# Patient Record
Sex: FEMALE | Race: WHITE | Employment: OTHER | URBAN - METROPOLITAN AREA
[De-identification: names, ages, dates, MRNs, and addresses within clinical notes are randomized per-mention and may not be internally consistent; named-entity substitution may affect disease eponyms.]

---

## 2017-06-28 ENCOUNTER — HOSPITAL ENCOUNTER (OUTPATIENT)
Dept: MRI IMAGING | Age: 62
Discharge: HOME | End: 2017-06-28
Attending: FAMILY MEDICINE
Payer: COMMERCIAL

## 2017-06-28 DIAGNOSIS — M25.532 LEFT WRIST PAIN: ICD-10-CM

## 2017-06-28 PROCEDURE — 73221 MRI JOINT UPR EXTREM W/O DYE: CPT

## 2017-07-21 ENCOUNTER — HOSPITAL ENCOUNTER (OUTPATIENT)
Dept: MAMMOGRAPHY | Age: 62
Discharge: HOME | End: 2017-07-21
Attending: OBSTETRICS & GYNECOLOGY
Payer: COMMERCIAL

## 2017-07-21 DIAGNOSIS — Z12.31 ENCOUNTER FOR SCREENING MAMMOGRAM FOR MALIGNANT NEOPLASM OF BREAST: ICD-10-CM

## 2017-07-21 PROCEDURE — 77067 SCR MAMMO BI INCL CAD: CPT

## 2017-09-15 ENCOUNTER — HOSPITAL ENCOUNTER (OUTPATIENT)
Dept: CT IMAGING | Age: 62
Discharge: HOME | End: 2017-09-15
Payer: COMMERCIAL

## 2017-09-15 DIAGNOSIS — Z79.01 ADMISSION FOR LONG-TERM (CURRENT) USE OF ANTICOAGULANTS: ICD-10-CM

## 2017-09-15 DIAGNOSIS — Z51.81 ADMISSION FOR LONG-TERM (CURRENT) USE OF ANTICOAGULANTS: ICD-10-CM

## 2017-09-15 DIAGNOSIS — Z80.0 FAMILY HISTORY OF MALIGNANT NEOPLASM OF GASTROINTESTINAL TRACT: ICD-10-CM

## 2017-09-15 DIAGNOSIS — Z12.11 SPECIAL SCREENING FOR MALIGNANT NEOPLASMS, COLON: ICD-10-CM

## 2017-09-15 PROCEDURE — 74261 CT COLONOGRAPHY DX: CPT

## 2017-11-01 ENCOUNTER — HOSPITAL ENCOUNTER (OUTPATIENT)
Dept: PHYSICAL THERAPY | Age: 62
Discharge: HOME | End: 2017-11-01
Attending: FAMILY MEDICINE
Payer: COMMERCIAL

## 2017-11-01 DIAGNOSIS — M25.532 LEFT WRIST PAIN: ICD-10-CM

## 2017-11-01 PROCEDURE — 97165 OT EVAL LOW COMPLEX 30 MIN: CPT

## 2017-11-01 NOTE — PROGRESS NOTES
Ambulatory/Rehab Services H2 Model Falls Risk Assessment    Risk Factor Pts. ·   Confusion/Disorientation/Impulsivity  []    4 ·   Symptomatic Depression  []   2 ·   Altered Elimination  []   1 ·   Dizziness/Vertigo  []   1 ·   Gender (Male)  []   1 ·   Any administered antiepileptics (anticonvulsants):  []   2 ·   Any administered benzodiazepines:  []   1 ·   Visual Impairment (specify):  []   1 ·   Portable Oxygen Use  []   1 ·   Orthostatic ? BP  []   1 ·   History of Recent Falls (within 3 mos.)  []   5     Ability to Rise from Chair (choose one) Pts. ·   Ability to rise in a single movement  [x]   0 ·   Pushes up, successful in one attempt  []   1 ·   Multiple attempts, but successful  []   3 ·   Unable to rise without assistance  []   4   Total: (5 or greater = High Risk) 0     Falls Prevention Plan:   []                Physical Limitations to Exercise (specify):   []                Mobility Assistance Device (type):   []                Exercise/Equipment Adaptation (specify):    ©2010 [facility] Patent #[id number].  Federal Law prohibits the replication, distribution or use without written permission from [facility]

## 2017-11-01 NOTE — PROGRESS NOTES
Maxwell Rosaura  : 1955 [facility] [place] [address], [place] [address].  [phone]   [phone]         OUTPATIENT OCCUPATIONAL THERAPY: Initial Assessment 2017    ICD-10: Treatment Diagnosis: Stiffness of left wrist, not elsewhere classified (M25.632)Pain in left wrist (M25.532)  Precautions/Allergies:   Codeine; Darvocet a500 [propoxyphene n-acetaminophen]; Iodinated contrast- oral and iv dye; and Phenergan [promethazine]   Fall Risk Score: 0 (? 5 = High Risk)  MD Orders: Evaluate and treat MEDICAL/REFERRING DIAGNOSIS:   Left wrist pain [M25.532]   DATE OF ONSET: several months ago   REFERRING PHYSICIAN: Aylin Franco MD  RETURN PHYSICIAN APPOINTMENT: 6 weeks     INITIAL ASSESSMENT:  Ms. Bo Arango presents with decreased functional use, strength and range of motion of her left wrist and upper extremity that is affecting her independence with activities of daily living and ability to perform job tasks. I feel that Ms. Bo Arango will benefit from skilled occupational therapy to maximize the functional use of her wrist and upper extremity in daily activities and work tasks. PLAN OF CARE:   PROBLEM LIST:  1. Pain in left wrist.  2. Decreased motion in left wrist.  3. Decreased strength in left hand. INTERVENTIONS PLANNED:  1. Modalities that may include fluidotherapy, paraffin, ultrasound, and light therapy. 2. Therapeutic exercise including a home exercise program.  3. Manual therapy. 4. Therapeutic activities. TREATMENT PLAN:  Effective Dates: 2017 TO 2018. Frequency/Duration: 1 time a week for 10 weeks  GOALS: (Goals have been discussed and agreed upon with patient.)  Short-Term Functional Goals: Time Frame: 4 weeks  1. Decrease pain to 4 to allow patient to perform self care tasks. 2. Increase motion in left wrist by 10 degrees to improve functional use of upper extremity in ADL activities.   3. Increase strength in left hand by 5 pounds to allow patient to  and lift objects during self care activities. Discharge Goals: Time Frame: 10 weeks  1. Decrease pain to 2 to allow patient to perform all household and work tasks. 2. Increase motion in left wrist by 20 degreees to allow patient to perform all ADL activities. 3. Increase strength in left hand by 8 pounds to allow patient to , lift, hold, and carry heavy objects. Rehabilitation Potential For Stated Goals: Good  Regarding Ramond Lipoma Kasie's therapy, I certify that the treatment plan above will be carried out by a therapist or under their direction. Thank you for this referral,  Nelwyn Kussmaul, OT       Referring Physician Signature: Rudolpho Klinefelter, MD _________________________  Date _________            The information in this section was collected on 11/1/2017 (except where otherwise noted). OCCUPATIONAL PROFILE & HISTORY:   History of Present Injury/Illness (Reason for Referral): The patient has developed increasing pain in her left wrist for several months. Past Medical History/Comorbidities:   Ms. Dirk Lozano  has a past medical history of Allergic rhinitis; Asthma; Atrial fibrillation ([facility] [address] (11/7/2013); Chicken pox; Coronary artery disease; Depression; Essential hypertension, benign; Fatty liver; GERD (gastroesophageal reflux disease); Hyperlipidemia; Measles; Mitral stenosis; Osteoarthritis; Other disorder of calcium metabolism; Pneumonia (1984); Rheumatic fever; Rosacea; TIA (transient ischemic attack) (2013); and Unspecified hypothyroidism. Ms. Dirk Lozano  has a past surgical history that includes gyn; refractive surgery (Bilateral); cardiac surg procedure unlist (2010); heart catheterization (2010); colonoscopy (2008); colonoscopy (2013); pacemaker (2013); and tonsillectomy (1983).   Social History/Living Environment:   Home Environment: Private residence  Prior Level of Function/Work/Activity:  independent  Dominant Side:         RIGHT    Current Medications: Current Outpatient Prescriptions:     Bacillus coagulans 250 million cell chew, Take  by mouth., Disp: , Rfl:     diclofenac (VOLTAREN) 1 % gel, Apply 4 g to affected area four (4) times daily. (Patient taking differently: Apply 4 g to affected area three (3) times daily.), Disp: 500 g, Rfl: 5    budesonide-formoterol (SYMBICORT) 160-4.5 mcg/actuation HFA inhaler, Take 2 Puffs by inhalation two (2) times a day., Disp: 3 Inhaler, Rfl: 3    clorazepate (TRANXENE) 7.5 mg tablet, Take 1 Tab by mouth two (2) times a day. Max Daily Amount: 15 mg. (Patient taking differently: Take 7.5 mg by mouth as needed.), Disp: 30 Tab, Rfl: 5    simvastatin (ZOCOR) 40 mg tablet, Take 1 Tab by mouth nightly., Disp: 90 Tab, Rfl: 3    warfarin (COUMADIN) 5 mg tablet, Take 1 Tab by mouth daily. , Disp: 90 Tab, Rfl: 3    montelukast (SINGULAIR) 10 mg tablet, Take 1 Tab by mouth daily. , Disp: 90 Tab, Rfl: 3    fluticasone (FLONASE) 50 mcg/actuation nasal spray, 2 sprays each nostril daily, Disp: 3 Bottle, Rfl: 3    albuterol (PROAIR HFA) 90 mcg/actuation inhaler, Take 2 Puffs by inhalation every four (4) hours as needed for Wheezing., Disp: 3 Inhaler, Rfl: 3    co-enzyme Q-10 (CO Q-10) 100 mg capsule, Take 100 mg by mouth daily. , Disp: , Rfl:     multivitamin (ONE A DAY) tablet, Take 1 Tab by mouth daily. , Disp: , Rfl:     acetaminophen (TYLENOL EXTRA STRENGTH) 500 mg tablet, Take  by mouth every six (6) hours as needed for Pain., Disp: , Rfl:     naproxen (NAPROSYN) 500 mg tablet, Take 500 mg by mouth as needed. , Disp: , Rfl:     B.infantis-B.ani-B.long-B.bifi (PROBIOTIC 4X) 10-15 mg TbEC, Take 1 Tab by mouth daily. , Disp: , Rfl:     aspirin delayed-release 81 mg tablet, Take 162 mg by mouth daily. , Disp: , Rfl:     Omega-3-DHA-EPA-Fish Oil 1,000 (120-180) mg cap, Take  by mouth., Disp: , Rfl:    Date Last Reviewed:  11/1/2017    Number of medical conditions (excluding presenting problem) that affect the Plan of Care: Brief history (0):  LOW COMPLEXITY   ASSESSMENT OF OCCUPATIONAL PERFORMANCE:   RANGE OF MOTION:     · AROM: Left wrist motion is as follows: extension 50, flexion 35, U.D. 30, R.D. 15, supination 80, pronation 84 degrees. STRENGTH:   STRENGTH: Right 58 lbs. Left 52 lbs. LAT PINCH: Right 16 lbs. Left 14 lbs. SENSATION:  Intact           Physical Skills Involved:  1. Range of Motion  2. Strength  3. Pain (Chronic) Cognitive Skills Affected (resulting in the inability to perform in a timely and safe manner): 1. none Psychosocial Skills Affected:  1. none   Number of elements that affect the Plan of Care: 1-3:  LOW COMPLEXITY   CLINICAL DECISION MAKING:   Outcome Measure: Tool Used: Disabilities of the Arm, Shoulder and Hand (DASH) Questionnaire - Quick Version  Score:  Initial: 22/55  Most Recent: X/55 (Date: -- )   Interpretation of Score: The DASH is designed to measure the activities of daily living in person's with upper extremity dysfunction or pain. Each section is scored on a 1-5 scale, 5 representing the greatest disability. The scores of each section are added together for a total score of 55. Score 11 12-19 20-28 29-37 38-45 46-54 55   Modifier CH CI CJ CK CL CM CN     ? Carrying, Moving, and Handling Objects:     - CURRENT STATUS: CJ - 20%-39% impaired, limited or restricted    - GOAL STATUS: CI - 1%-19% impaired, limited or restricted    - D/C STATUS:  ---------------To be determined---------------      Medical Necessity:   · Patient is expected to demonstrate progress in strength and range of motion to increase independence with ADL,household and work activities. .  Reason for Services/Other Comments:  · Patient has limited motion ,strength and function in her left U.E..  Clinical Decision-Making Assessment:     Clinical Decision-Making: LOW COMPLEXITY   TREATMENT:   (In addition to Assessment/Re-Assessment sessions the following treatments were rendered)  Pre-treatment Symptoms/Complaints:  Pain and stiffness in left wrist.  Pain: Initial: Pain Intensity 1: 7  Pain Location 1: Wrist, Hand  Pain Orientation 1: Left    Post Session:  0     Patient was instructed in a home exercise program.      Treatment/Session Assessment:    · Response to Treatment:  Patients tolerated treatment well with no complications. Upon completion of treatment, skin condition was normal..  · Compliance with Program/Exercises: Will assess as treatment progresses. · Recommendations/Intent for next treatment session: \"Next visit will focus on advancements to more challenging activities\".   Total Treatment Duration:  OT Patient Time In/Time Out  Time In: 0730  Time Out: [address] [facility],

## 2017-11-09 ENCOUNTER — HOSPITAL ENCOUNTER (OUTPATIENT)
Dept: PHYSICAL THERAPY | Age: 62
Discharge: HOME | End: 2017-11-09
Attending: FAMILY MEDICINE
Payer: COMMERCIAL

## 2017-11-09 PROCEDURE — 97018 PARAFFIN BATH THERAPY: CPT

## 2017-11-09 PROCEDURE — 97110 THERAPEUTIC EXERCISES: CPT

## 2017-11-09 PROCEDURE — 97140 MANUAL THERAPY 1/> REGIONS: CPT

## 2017-11-10 NOTE — PROGRESS NOTES
Shira Alfaro  : 1955 [facility] [place] [address] [facility], [address], [place] [address].  [phone]   [phone]         OUTPATIENT OCCUPATIONAL THERAPY: Daily Note 2017    ICD-10: Treatment Diagnosis: Stiffness of left wrist, not elsewhere classified (M25.632)Pain in left wrist (M25.532)  Precautions/Allergies:   Codeine; Darvocet a500 [propoxyphene n-acetaminophen]; Iodinated contrast- oral and iv dye; and Phenergan [promethazine]   Fall Risk Score: 0 (? 5 = High Risk)  MD Orders: Evaluate and treat MEDICAL/REFERRING DIAGNOSIS:   Pain in left wrist [M25.532]   DATE OF ONSET: several months ago   REFERRING PHYSICIAN: Freddy Rhoades MD  RETURN PHYSICIAN APPOINTMENT: 6 weeks     INITIAL ASSESSMENT:  Ms. Bri Pretty presents with decreased functional use, strength and range of motion of her left wrist and upper extremity that is affecting her independence with activities of daily living and ability to perform job tasks. I feel that Ms. Bri Pretty will benefit from skilled occupational therapy to maximize the functional use of her wrist and upper extremity in daily activities and work tasks. PLAN OF CARE:   PROBLEM LIST:  1. Pain in left wrist.  2. Decreased motion in left wrist.  3. Decreased strength in left hand. INTERVENTIONS PLANNED:  1. Modalities that may include fluidotherapy, paraffin, ultrasound, and light therapy. 2. Therapeutic exercise including a home exercise program.  3. Manual therapy. 4. Therapeutic activities. TREATMENT PLAN:  Effective Dates: 2017 TO 2018. Frequency/Duration: 1 time a week for 10 weeks  GOALS: (Goals have been discussed and agreed upon with patient.)  Short-Term Functional Goals: Time Frame: 4 weeks  1. Decrease pain to 4 to allow patient to perform self care tasks. 2. Increase motion in left wrist by 10 degrees to improve functional use of upper extremity in ADL activities.   3. Increase strength in left hand by 5 pounds to allow patient to  and lift objects during self care activities. Discharge Goals: Time Frame: 10 weeks  1. Decrease pain to 2 to allow patient to perform all household and work tasks. 2. Increase motion in left wrist by 20 degreees to allow patient to perform all ADL activities. 3. Increase strength in left hand by 8 pounds to allow patient to , lift, hold, and carry heavy objects. Rehabilitation Potential For Stated Goals: Good  Regarding Deann Ferro's therapy, I certify that the treatment plan above will be carried out by a therapist or under their direction. Thank you for this referral,  Louie Fleming OT       Referring Physician Signature: Al Fragoso MD _________________________  Date _________            The information in this section was collected on 11/1/2017 (except where otherwise noted). OCCUPATIONAL PROFILE & HISTORY:   History of Present Injury/Illness (Reason for Referral): The patient has developed increasing pain in her left wrist for several months. Past Medical History/Comorbidities:   Ms. Ginnie Krabbe  has a past medical history of Allergic rhinitis; Asthma; Atrial fibrillation ([address] (11/7/2013); Chicken pox; Coronary artery disease; Depression; Essential hypertension, benign; Fatty liver; GERD (gastroesophageal reflux disease); Hyperlipidemia; Measles; Mitral stenosis; Osteoarthritis; Other disorder of calcium metabolism; Pneumonia (1984); Rheumatic fever; Rosacea; TIA (transient ischemic attack) (2013); and Unspecified hypothyroidism. Ms. Ginnie Krabbe  has a past surgical history that includes gyn; refractive surgery (Bilateral); cardiac surg procedure unlist (2010); heart catheterization (2010); colonoscopy (2008); colonoscopy (2013); pacemaker (2013); and tonsillectomy (1983).   Social History/Living Environment:      Prior Level of Function/Work/Activity:  independent  Dominant Side:         RIGHT    Current Medications:    Current Outpatient Prescriptions:    Bacillus coagulans 250 million cell chew, Take  by mouth., Disp: , Rfl:     diclofenac (VOLTAREN) 1 % gel, Apply 4 g to affected area four (4) times daily. (Patient taking differently: Apply 4 g to affected area three (3) times daily.), Disp: 500 g, Rfl: 5    budesonide-formoterol (SYMBICORT) 160-4.5 mcg/actuation HFA inhaler, Take 2 Puffs by inhalation two (2) times a day., Disp: 3 Inhaler, Rfl: 3    clorazepate (TRANXENE) 7.5 mg tablet, Take 1 Tab by mouth two (2) times a day. Max Daily Amount: 15 mg. (Patient taking differently: Take 7.5 mg by mouth as needed.), Disp: 30 Tab, Rfl: 5    simvastatin (ZOCOR) 40 mg tablet, Take 1 Tab by mouth nightly., Disp: 90 Tab, Rfl: 3    warfarin (COUMADIN) 5 mg tablet, Take 1 Tab by mouth daily. , Disp: 90 Tab, Rfl: 3    montelukast (SINGULAIR) 10 mg tablet, Take 1 Tab by mouth daily. , Disp: 90 Tab, Rfl: 3    fluticasone (FLONASE) 50 mcg/actuation nasal spray, 2 sprays each nostril daily, Disp: 3 Bottle, Rfl: 3    albuterol (PROAIR HFA) 90 mcg/actuation inhaler, Take 2 Puffs by inhalation every four (4) hours as needed for Wheezing., Disp: 3 Inhaler, Rfl: 3    co-enzyme Q-10 (CO Q-10) 100 mg capsule, Take 100 mg by mouth daily. , Disp: , Rfl:     multivitamin (ONE A DAY) tablet, Take 1 Tab by mouth daily. , Disp: , Rfl:     acetaminophen (TYLENOL EXTRA STRENGTH) 500 mg tablet, Take  by mouth every six (6) hours as needed for Pain., Disp: , Rfl:     naproxen (NAPROSYN) 500 mg tablet, Take 500 mg by mouth as needed. , Disp: , Rfl:     B.infantis-B.ani-B.long-B.bifi (PROBIOTIC 4X) 10-15 mg TbEC, Take 1 Tab by mouth daily. , Disp: , Rfl:     aspirin delayed-release 81 mg tablet, Take 162 mg by mouth daily. , Disp: , Rfl:     Omega-3-DHA-EPA-Fish Oil 1,000 (120-180) mg cap, Take  by mouth., Disp: , Rfl:    Date Last Reviewed:  11/9/2017    Number of medical conditions (excluding presenting problem) that affect the Plan of Care: Brief history (0):  LOW COMPLEXITY ASSESSMENT OF OCCUPATIONAL PERFORMANCE:   RANGE OF MOTION:     · AROM: Left wrist motion is as follows: extension 50, flexion 35, U.D. 30, R.D. 15, supination 80, pronation 84 degrees. STRENGTH:   STRENGTH: Right 58 lbs. Left 52 lbs. LAT PINCH: Right 16 lbs. Left 14 lbs. SENSATION:  Intact           Physical Skills Involved:  1. Range of Motion  2. Strength  3. Pain (Chronic) Cognitive Skills Affected (resulting in the inability to perform in a timely and safe manner): 1. none Psychosocial Skills Affected:  1. none   Number of elements that affect the Plan of Care: 1-3:  LOW COMPLEXITY   CLINICAL DECISION MAKING:   Outcome Measure: Tool Used: Disabilities of the Arm, Shoulder and Hand (DASH) Questionnaire - Quick Version  Score:  Initial: 22/55  Most Recent: X/55 (Date: -- )   Interpretation of Score: The DASH is designed to measure the activities of daily living in person's with upper extremity dysfunction or pain. Each section is scored on a 1-5 scale, 5 representing the greatest disability. The scores of each section are added together for a total score of 55. Score 11 12-19 20-28 29-37 38-45 46-54 55   Modifier CH CI CJ CK CL CM CN     ? Carrying, Moving, and Handling Objects:     - CURRENT STATUS: CJ - 20%-39% impaired, limited or restricted    - GOAL STATUS: CI - 1%-19% impaired, limited or restricted    - D/C STATUS:  ---------------To be determined---------------      Medical Necessity:   · Patient is expected to demonstrate progress in strength and range of motion to increase independence with ADL,household and work activities. .  Reason for Services/Other Comments:  · Patient has limited motion ,strength and function in her left U.E..  Clinical Decision-Making Assessment:     Clinical Decision-Making: LOW COMPLEXITY   TREATMENT:   (In addition to Assessment/Re-Assessment sessions the following treatments were rendered)  Pre-treatment Symptoms/Complaints:  Pain and stiffness in left wrist.  Pain: Initial: Pain Intensity 1: 5  Pain Location 1: Hand, Wrist  Pain Orientation 1: Left    Post Session:  0     Patient was instructed in a home exercise program.  Patient stated \"I am moving a little better\"    Manual Therapy: (Soft Tissue Mobilization Duration  Duration: 15 Minutes  Duration: 15 Minutes): Technique: Retrograde massage (followed by light tx)  LUE Soft Tissue Mobilization: Yes  Technique: Retrograde massage   Therapeutic Exercise:                                                                               : The patient's home exercise program was removed. Date:  11/9/17 Date: Date: Date: Date:   Activity/Exercise Parameters Parameters Parameters Parameters Parameters   AROM during Fluidotherapy 20 min       Paraffin with Stretch   15 min       Retrograde massage, Friction Scar massage, Joint Mobilization   15 min  Light tx       Scarf Curl   2 min       Washer Game 2 min       Individual Gripper          Hand Ashton          Cones          Pegs          Clothes Pins          A-R Bar          Exerstick          Velcro-Roll                  RESISTIVE EXERCISES Weight/ Sets/Reps   Weight/ Sets/Reps Weight/ Sets/Reps Weight/ Sets/Reps Weight/ Sets/Reps   WEIGHT WELL        Sup/Pro        UD/RD        Wrist Flex/Ext        Free Weights          UBE(Minutes)          Nautilus        Compound Row        Vertical Chest        Overhead Press                    HEP: As above; handouts given to patient for all exercises.     Therapeutic Modalities:         Left Wrist Heat  Type: Paraffin bath  Duration: 15 minutes  Patient Position: Sitting                                     Joint Mobilization:        Treatment Times:  · Therapeutic Exercise: 15 minutes  · Manual Therapy: 15 minutes  · Parafin: 15 minutes  · Whirlpool:  minutes  · Other:  minutes       Treatment/Session Assessment:    · Response to Treatment:  Patients tolerated treatment well with no complications. Upon completion of treatment, skin condition was normal..  · Compliance with Program/Exercises: Will assess as treatment progresses. · Recommendations/Intent for next treatment session: \"Next visit will focus on advancements to more challenging activities\". Will continue per MD.  Total Treatment Duration:  OT Patient Time In/Time Out  Time In: 0730  Time Out: [address] [facility],

## 2017-11-16 ENCOUNTER — HOSPITAL ENCOUNTER (OUTPATIENT)
Dept: PHYSICAL THERAPY | Age: 62
Discharge: HOME | End: 2017-11-16
Attending: FAMILY MEDICINE
Payer: COMMERCIAL

## 2017-11-16 PROCEDURE — 97018 PARAFFIN BATH THERAPY: CPT

## 2017-11-16 PROCEDURE — 97110 THERAPEUTIC EXERCISES: CPT

## 2017-11-16 PROCEDURE — 97140 MANUAL THERAPY 1/> REGIONS: CPT

## 2017-11-16 NOTE — PROGRESS NOTES
Brenda Horacio BROOKSB: 1955 [facility]  [address], [address] [address],[address], [address] [facility]  [phone]   [phone]         OUTPATIENT OCCUPATIONAL THERAPY: Daily Note 2017    ICD-10: Treatment Diagnosis: Stiffness of left wrist, not elsewhere classified (M25.632)Pain in left wrist (M25.532)  Precautions/Allergies:   Codeine; Darvocet a500 [propoxyphene n-acetaminophen]; Iodinated contrast- oral and iv dye; and Phenergan [promethazine]   Fall Risk Score: 0 (? 5 = High Risk)  MD Orders: Evaluate and treat MEDICAL/REFERRING DIAGNOSIS:   Pain in left wrist [M25.532]   DATE OF ONSET: several months ago   REFERRING PHYSICIAN: Maggie Long MD  RETURN PHYSICIAN APPOINTMENT: 6 weeks     INITIAL ASSESSMENT:  Ms. Nathaly Agee presents with decreased functional use, strength and range of motion of her left wrist and upper extremity that is affecting her independence with activities of daily living and ability to perform job tasks. I feel that Ms. Nathaly Agee will benefit from skilled occupational therapy to maximize the functional use of her wrist and upper extremity in daily activities and work tasks. PLAN OF CARE:   PROBLEM LIST:  1. Pain in left wrist.  2. Decreased motion in left wrist.  3. Decreased strength in left hand. INTERVENTIONS PLANNED:  1. Modalities that may include fluidotherapy, paraffin, ultrasound, and light therapy. 2. Therapeutic exercise including a home exercise program.  3. Manual therapy. 4. Therapeutic activities. TREATMENT PLAN:  Effective Dates: 2017 TO 2018. Frequency/Duration: 1 time a week for 10 weeks  GOALS: (Goals have been discussed and agreed upon with patient.)  Short-Term Functional Goals: Time Frame: 4 weeks  1. Decrease pain to 4 to allow patient to perform self care tasks. 2. Increase motion in left wrist by 10 degrees to improve functional use of upper extremity in ADL activities.   3. Increase strength in left hand by 5 pounds to allow patient to  and lift objects during self care activities. Discharge Goals: Time Frame: 10 weeks  1. Decrease pain to 2 to allow patient to perform all household and work tasks. 2. Increase motion in left wrist by 20 degreees to allow patient to perform all ADL activities. 3. Increase strength in left hand by 8 pounds to allow patient to , lift, hold, and carry heavy objects. Rehabilitation Potential For Stated Goals: Good  Regarding Olive Ferro's therapy, I certify that the treatment plan above will be carried out by a therapist or under their direction. Thank you for this referral,  Janis Alejandra OT       Referring Physician Signature: Maggie Long MD _________________________  Date _________            The information in this section was collected on 11/1/2017 (except where otherwise noted). OCCUPATIONAL PROFILE & HISTORY:   History of Present Injury/Illness (Reason for Referral): The patient has developed increasing pain in her left wrist for several months. Past Medical History/Comorbidities:   Ms. Nathaly Agee  has a past medical history of Allergic rhinitis; Asthma; Atrial fibrillation ([facility] [address] (11/7/2013); Chicken pox; Coronary artery disease; Depression; Essential hypertension, benign; Fatty liver; GERD (gastroesophageal reflux disease); Hyperlipidemia; Measles; Mitral stenosis; Osteoarthritis; Other disorder of calcium metabolism; Pneumonia (1984); Rheumatic fever; Rosacea; TIA (transient ischemic attack) (2013); and Unspecified hypothyroidism. Ms. Nathaly Agee  has a past surgical history that includes gyn; refractive surgery (Bilateral); cardiac surg procedure unlist (2010); heart catheterization (2010); colonoscopy (2008); colonoscopy (2013); pacemaker (2013); and tonsillectomy (1983).   Social History/Living Environment:      Prior Level of Function/Work/Activity:  independent  Dominant Side:         RIGHT    Current Medications:    Current Outpatient Prescriptions:    Bacillus coagulans 250 million cell chew, Take  by mouth., Disp: , Rfl:     diclofenac (VOLTAREN) 1 % gel, Apply 4 g to affected area four (4) times daily. (Patient taking differently: Apply 4 g to affected area three (3) times daily.), Disp: 500 g, Rfl: 5    budesonide-formoterol (SYMBICORT) 160-4.5 mcg/actuation HFA inhaler, Take 2 Puffs by inhalation two (2) times a day., Disp: 3 Inhaler, Rfl: 3    clorazepate (TRANXENE) 7.5 mg tablet, Take 1 Tab by mouth two (2) times a day. Max Daily Amount: 15 mg. (Patient taking differently: Take 7.5 mg by mouth as needed.), Disp: 30 Tab, Rfl: 5    simvastatin (ZOCOR) 40 mg tablet, Take 1 Tab by mouth nightly., Disp: 90 Tab, Rfl: 3    warfarin (COUMADIN) 5 mg tablet, Take 1 Tab by mouth daily. , Disp: 90 Tab, Rfl: 3    montelukast (SINGULAIR) 10 mg tablet, Take 1 Tab by mouth daily. , Disp: 90 Tab, Rfl: 3    fluticasone (FLONASE) 50 mcg/actuation nasal spray, 2 sprays each nostril daily, Disp: 3 Bottle, Rfl: 3    albuterol (PROAIR HFA) 90 mcg/actuation inhaler, Take 2 Puffs by inhalation every four (4) hours as needed for Wheezing., Disp: 3 Inhaler, Rfl: 3    co-enzyme Q-10 (CO Q-10) 100 mg capsule, Take 100 mg by mouth daily. , Disp: , Rfl:     multivitamin (ONE A DAY) tablet, Take 1 Tab by mouth daily. , Disp: , Rfl:     acetaminophen (TYLENOL EXTRA STRENGTH) 500 mg tablet, Take  by mouth every six (6) hours as needed for Pain., Disp: , Rfl:     naproxen (NAPROSYN) 500 mg tablet, Take 500 mg by mouth as needed. , Disp: , Rfl:     B.infantis-B.ani-B.long-B.bifi (PROBIOTIC 4X) 10-15 mg TbEC, Take 1 Tab by mouth daily. , Disp: , Rfl:     aspirin delayed-release 81 mg tablet, Take 162 mg by mouth daily. , Disp: , Rfl:     Omega-3-DHA-EPA-Fish Oil 1,000 (120-180) mg cap, Take  by mouth., Disp: , Rfl:    Date Last Reviewed:  11/9/2017    Number of medical conditions (excluding presenting problem) that affect the Plan of Care: Brief history (0):  LOW COMPLEXITY ASSESSMENT OF OCCUPATIONAL PERFORMANCE:   RANGE OF MOTION:     · AROM: Left wrist motion is as follows: extension 50, flexion 35, U.D. 30, R.D. 15, supination 80, pronation 84 degrees. STRENGTH:   STRENGTH: Right 58 lbs. Left 52 lbs. LAT PINCH: Right 16 lbs. Left 14 lbs. SENSATION:  Intact           Physical Skills Involved:  1. Range of Motion  2. Strength  3. Pain (Chronic) Cognitive Skills Affected (resulting in the inability to perform in a timely and safe manner): 1. none Psychosocial Skills Affected:  1. none   Number of elements that affect the Plan of Care: 1-3:  LOW COMPLEXITY   CLINICAL DECISION MAKING:   Outcome Measure: Tool Used: Disabilities of the Arm, Shoulder and Hand (DASH) Questionnaire - Quick Version  Score:  Initial: 22/55  Most Recent: X/55 (Date: -- )   Interpretation of Score: The DASH is designed to measure the activities of daily living in person's with upper extremity dysfunction or pain. Each section is scored on a 1-5 scale, 5 representing the greatest disability. The scores of each section are added together for a total score of 55. Score 11 12-19 20-28 29-37 38-45 46-54 55   Modifier CH CI CJ CK CL CM CN     ? Carrying, Moving, and Handling Objects:     - CURRENT STATUS: CJ - 20%-39% impaired, limited or restricted    - GOAL STATUS: CI - 1%-19% impaired, limited or restricted    - D/C STATUS:  ---------------To be determined---------------      Medical Necessity:   · Patient is expected to demonstrate progress in strength and range of motion to increase independence with ADL,household and work activities. .  Reason for Services/Other Comments:  · Patient has limited motion ,strength and function in her left U.E..  Clinical Decision-Making Assessment:     Clinical Decision-Making: LOW COMPLEXITY   TREATMENT:   (In addition to Assessment/Re-Assessment sessions the following treatments were rendered)  Pre-treatment Symptoms/Complaints:  Pain and stiffness in left wrist.  Pain: Initial: Pain Intensity 1: 3  Pain Location 1: Hand, Wrist  Pain Orientation 1: Left    Post Session:  0     Patient was instructed in a home exercise program.  Patient stated \"I am still a little sore\"    Manual Therapy: (Soft Tissue Mobilization Duration  Duration: 15 Minutes  Duration: 15 Minutes): Technique: Retrograde massage (followed by Light tx)  LUE Soft Tissue Mobilization: Yes  Technique: Retrograde massage (followed by Light tx)   Therapeutic Exercise:                                                                               : The patient's home exercise program was removed. Date:  11/9/17 Date:  11/16/17 Date: Date: Date:   Activity/Exercise Parameters Parameters Parameters Parameters Parameters   AROM during Fluidotherapy 20 min 20 min      Paraffin with Stretch   15 min 15 min      Retrograde massage, Friction Scar massage, Joint Mobilization   15 min  Light tx 15 min  Light tx      Scarf Curl   2 min 2 min      Washer Game 2 min 2 min      Individual Gripper          Hand Hope          Cones          Pegs          Clothes Pins          A-R Bar          Exerstick          Velcro-Roll                  RESISTIVE EXERCISES Weight/ Sets/Reps   Weight/ Sets/Reps Weight/ Sets/Reps Weight/ Sets/Reps Weight/ Sets/Reps   WEIGHT WELL        Sup/Pro        UD/RD        Wrist Flex/Ext        Free Weights          UBE(Minutes)          Nautilus        Compound Row        Vertical Chest        Overhead Press                    HEP: As above; handouts given to patient for all exercises.     Therapeutic Modalities:         Left Wrist Heat  Type: Paraffin bath  Duration: 15 minutes  Patient Position: Sitting                                     Joint Mobilization:        Treatment Times:  · Therapeutic Exercise: 15 minutes  · Manual Therapy: 15 minutes  · Parafin: 15 minutes  · Whirlpool: minutes  · Other:  minutes       Treatment/Session Assessment:    · Response to Treatment:  Patients tolerated treatment well with no complications. Upon completion of treatment, skin condition was normal..  · Compliance with Program/Exercises: Will assess as treatment progresses. · Recommendations/Intent for next treatment session: \"Next visit will focus on advancements to more challenging activities\". Will continue per MD.  Total Treatment Duration:  OT Patient Time In/Time Out  Time In: 0730  Time Out: [address],

## 2017-11-22 ENCOUNTER — HOSPITAL ENCOUNTER (OUTPATIENT)
Dept: PHYSICAL THERAPY | Age: 62
Discharge: HOME | End: 2017-11-22
Attending: FAMILY MEDICINE
Payer: COMMERCIAL

## 2017-11-22 PROCEDURE — 97140 MANUAL THERAPY 1/> REGIONS: CPT

## 2017-11-22 PROCEDURE — 97018 PARAFFIN BATH THERAPY: CPT

## 2017-11-22 PROCEDURE — 97110 THERAPEUTIC EXERCISES: CPT

## 2017-11-22 NOTE — PROGRESS NOTES
Maxwell Lesaria  : 1955 [facility] [place] [address] [facility], [address], [place] [address].  [phone]   [phone]         OUTPATIENT OCCUPATIONAL THERAPY: Daily Note 2017    ICD-10: Treatment Diagnosis: Stiffness of left wrist, not elsewhere classified (M25.632)Pain in left wrist (M25.532)  Precautions/Allergies:   Codeine; Darvocet a500 [propoxyphene n-acetaminophen]; Iodinated contrast- oral and iv dye; and Phenergan [promethazine]   Fall Risk Score: 0 (? 5 = High Risk)  MD Orders: Evaluate and treat MEDICAL/REFERRING DIAGNOSIS:   Pain in left wrist [M25.532]   DATE OF ONSET: several months ago   REFERRING PHYSICIAN: Aylin Franco MD  RETURN PHYSICIAN APPOINTMENT: 6 weeks     INITIAL ASSESSMENT:  Ms. Bo Arango presents with decreased functional use, strength and range of motion of her left wrist and upper extremity that is affecting her independence with activities of daily living and ability to perform job tasks. I feel that Ms. Bo Arango will benefit from skilled occupational therapy to maximize the functional use of her wrist and upper extremity in daily activities and work tasks. PLAN OF CARE:   PROBLEM LIST:  1. Pain in left wrist.  2. Decreased motion in left wrist.  3. Decreased strength in left hand. INTERVENTIONS PLANNED:  1. Modalities that may include fluidotherapy, paraffin, ultrasound, and light therapy. 2. Therapeutic exercise including a home exercise program.  3. Manual therapy. 4. Therapeutic activities. TREATMENT PLAN:  Effective Dates: 2017 TO 2018. Frequency/Duration: 1 time a week for 10 weeks  GOALS: (Goals have been discussed and agreed upon with patient.)  Short-Term Functional Goals: Time Frame: 4 weeks  1. Decrease pain to 4 to allow patient to perform self care tasks. 2. Increase motion in left wrist by 10 degrees to improve functional use of upper extremity in ADL activities.   3. Increase strength in left hand by 5 pounds to allow patient to  and lift objects during self care activities. Discharge Goals: Time Frame: 10 weeks  1. Decrease pain to 2 to allow patient to perform all household and work tasks. 2. Increase motion in left wrist by 20 degreees to allow patient to perform all ADL activities. 3. Increase strength in left hand by 8 pounds to allow patient to , lift, hold, and carry heavy objects. Rehabilitation Potential For Stated Goals: Good  Regarding Tiago Ferro's therapy, I certify that the treatment plan above will be carried out by a therapist or under their direction. Thank you for this referral,  Rosa Bourne OT       Referring Physician Signature: Yesi Silva MD _________________________  Date _________            The information in this section was collected on 11/1/2017 (except where otherwise noted). OCCUPATIONAL PROFILE & HISTORY:   History of Present Injury/Illness (Reason for Referral): The patient has developed increasing pain in her left wrist for several months. Past Medical History/Comorbidities:   Ms. Katya Meza  has a past medical history of Allergic rhinitis; Asthma; Atrial fibrillation ([address] (11/7/2013); Chicken pox; Coronary artery disease; Depression; Essential hypertension, benign; Fatty liver; GERD (gastroesophageal reflux disease); Hyperlipidemia; Measles; Mitral stenosis; Osteoarthritis; Other disorder of calcium metabolism; Pneumonia (1984); Rheumatic fever; Rosacea; TIA (transient ischemic attack) (2013); and Unspecified hypothyroidism. Ms. Katya Meza  has a past surgical history that includes gyn; refractive surgery (Bilateral); cardiac surg procedure unlist (2010); heart catheterization (2010); colonoscopy (2008); colonoscopy (2013); pacemaker (2013); and tonsillectomy (1983).   Social History/Living Environment:      Prior Level of Function/Work/Activity:  independent  Dominant Side:         RIGHT    Current Medications:    Current Outpatient Prescriptions:    Bacillus coagulans 250 million cell chew, Take  by mouth., Disp: , Rfl:     diclofenac (VOLTAREN) 1 % gel, Apply 4 g to affected area four (4) times daily. (Patient taking differently: Apply 4 g to affected area three (3) times daily.), Disp: 500 g, Rfl: 5    budesonide-formoterol (SYMBICORT) 160-4.5 mcg/actuation HFA inhaler, Take 2 Puffs by inhalation two (2) times a day., Disp: 3 Inhaler, Rfl: 3    clorazepate (TRANXENE) 7.5 mg tablet, Take 1 Tab by mouth two (2) times a day. Max Daily Amount: 15 mg. (Patient taking differently: Take 7.5 mg by mouth as needed.), Disp: 30 Tab, Rfl: 5    simvastatin (ZOCOR) 40 mg tablet, Take 1 Tab by mouth nightly., Disp: 90 Tab, Rfl: 3    warfarin (COUMADIN) 5 mg tablet, Take 1 Tab by mouth daily. , Disp: 90 Tab, Rfl: 3    montelukast (SINGULAIR) 10 mg tablet, Take 1 Tab by mouth daily. , Disp: 90 Tab, Rfl: 3    fluticasone (FLONASE) 50 mcg/actuation nasal spray, 2 sprays each nostril daily, Disp: 3 Bottle, Rfl: 3    albuterol (PROAIR HFA) 90 mcg/actuation inhaler, Take 2 Puffs by inhalation every four (4) hours as needed for Wheezing., Disp: 3 Inhaler, Rfl: 3    co-enzyme Q-10 (CO Q-10) 100 mg capsule, Take 100 mg by mouth daily. , Disp: , Rfl:     multivitamin (ONE A DAY) tablet, Take 1 Tab by mouth daily. , Disp: , Rfl:     acetaminophen (TYLENOL EXTRA STRENGTH) 500 mg tablet, Take  by mouth every six (6) hours as needed for Pain., Disp: , Rfl:     naproxen (NAPROSYN) 500 mg tablet, Take 500 mg by mouth as needed. , Disp: , Rfl:     B.infantis-B.ani-B.long-B.bifi (PROBIOTIC 4X) 10-15 mg TbEC, Take 1 Tab by mouth daily. , Disp: , Rfl:     aspirin delayed-release 81 mg tablet, Take 162 mg by mouth daily. , Disp: , Rfl:     Omega-3-DHA-EPA-Fish Oil 1,000 (120-180) mg cap, Take  by mouth., Disp: , Rfl:    Date Last Reviewed: 11/22/2017    Number of medical conditions (excluding presenting problem) that affect the Plan of Care: Brief history (0):  LOW COMPLEXITY ASSESSMENT OF OCCUPATIONAL PERFORMANCE:   RANGE OF MOTION:     · AROM: Left wrist motion is as follows: extension 50, flexion 35, U.D. 30, R.D. 15, supination 80, pronation 84 degrees. STRENGTH:   STRENGTH: Right 58 lbs. Left 52 lbs. LAT PINCH: Right 16 lbs. Left 14 lbs. SENSATION:  Intact           Physical Skills Involved:  1. Range of Motion  2. Strength  3. Pain (Chronic) Cognitive Skills Affected (resulting in the inability to perform in a timely and safe manner): 1. none Psychosocial Skills Affected:  1. none   Number of elements that affect the Plan of Care: 1-3:  LOW COMPLEXITY   CLINICAL DECISION MAKING:   Outcome Measure: Tool Used: Disabilities of the Arm, Shoulder and Hand (DASH) Questionnaire - Quick Version  Score:  Initial: 22/55  Most Recent: X/55 (Date: -- )   Interpretation of Score: The DASH is designed to measure the activities of daily living in person's with upper extremity dysfunction or pain. Each section is scored on a 1-5 scale, 5 representing the greatest disability. The scores of each section are added together for a total score of 55. Score 11 12-19 20-28 29-37 38-45 46-54 55   Modifier CH CI CJ CK CL CM CN     ? Carrying, Moving, and Handling Objects:     - CURRENT STATUS: CJ - 20%-39% impaired, limited or restricted    - GOAL STATUS: CI - 1%-19% impaired, limited or restricted    - D/C STATUS:  ---------------To be determined---------------      Medical Necessity:   · Patient is expected to demonstrate progress in strength and range of motion to increase independence with ADL,household and work activities. .  Reason for Services/Other Comments:  · Patient has limited motion ,strength and function in her left U.E..  Clinical Decision-Making Assessment:     Clinical Decision-Making: LOW COMPLEXITY   TREATMENT:   (In addition to Assessment/Re-Assessment sessions the following treatments were rendered)  Pre-treatment Symptoms/Complaints:  Pain and stiffness in left wrist.  Pain: Initial: Pain Intensity 1: 3  Pain Location 1: Wrist  Pain Orientation 1: Left    Post Session:  0     Patient was instructed in a home exercise program.  Patient stated \"My wrist feels pretty good right now. Manual Therapy: (Soft Tissue Mobilization Duration  Duration: 15 Minutes  Duration: 15 Minutes): Technique: Retrograde massage (followed by Light tx)  LUE Soft Tissue Mobilization: Yes  Technique: Retrograde massage (followed by light tx)   Therapeutic Exercise:                                                                               : The patient's home exercise program was removed. Date:  11/9/17 Date:  11/16/17 Date:  11/22/17 Date: Date:   Activity/Exercise Parameters Parameters Parameters Parameters Parameters   AROM during Fluidotherapy 20 min 20 min 20 min     Paraffin with Stretch   15 min 15 min 15 min     Retrograde massage, Friction Scar massage, Joint Mobilization   15 min  Light tx 15 min  Light tx 15 min  Light tx     Scarf Curl   2 min 2 min 2 min     Washer Game 2 min 2 min 2 min     Individual Gripper          Hand Pioneer          Cones          Pegs          Clothes Pins          A-R Bar          Exerstick          Velcro-Roll                  RESISTIVE EXERCISES Weight/ Sets/Reps   Weight/ Sets/Reps Weight/ Sets/Reps Weight/ Sets/Reps Weight/ Sets/Reps   WEIGHT WELL        Sup/Pro        UD/RD        Wrist Flex/Ext        Free Weights          UBE(Minutes)          Nautilus        Compound Row        Vertical Chest        Overhead Press                    HEP: As above; handouts given to patient for all exercises.     Therapeutic Modalities:         Left Wrist Heat  Type: Paraffin bath  Duration: 15 minutes  Patient Position: Sitting                                     Joint Mobilization:        Treatment Times:  · Therapeutic Exercise: 15 minutes  · Manual Therapy: 15 minutes  · Parafin: 15 minutes  · Whirlpool:  minutes  · Other:  minutes       Treatment/Session Assessment:    · Response to Treatment:  Patients tolerated treatment well with no complications. Upon completion of treatment, skin condition was normal..  · Compliance with Program/Exercises: Will assess as treatment progresses. · Recommendations/Intent for next treatment session: \"Next visit will focus on advancements to more challenging activities\". Will continue per MD.  Total Treatment Duration:  OT Patient Time In/Time Out  Time In: 0730  Time Out: [address] [facility]

## 2017-11-29 ENCOUNTER — HOSPITAL ENCOUNTER (OUTPATIENT)
Dept: PHYSICAL THERAPY | Age: 62
Discharge: HOME | End: 2017-11-29
Attending: FAMILY MEDICINE
Payer: COMMERCIAL

## 2017-11-29 PROCEDURE — 97140 MANUAL THERAPY 1/> REGIONS: CPT

## 2017-11-29 PROCEDURE — 97110 THERAPEUTIC EXERCISES: CPT

## 2017-11-29 PROCEDURE — 97018 PARAFFIN BATH THERAPY: CPT

## 2017-11-29 NOTE — PROGRESS NOTES
Nacho Raymond  : 1955 [facility] [place] [address] [facility], [address], [place] [address].  [phone]   [phone]         OUTPATIENT OCCUPATIONAL THERAPY: Daily Note 2017    ICD-10: Treatment Diagnosis: Stiffness of left wrist, not elsewhere classified (M25.632)Pain in left wrist (M25.532)  Precautions/Allergies:   Codeine; Darvocet a500 [propoxyphene n-acetaminophen]; Iodinated contrast- oral and iv dye; and Phenergan [promethazine]   Fall Risk Score: 0 (? 5 = High Risk)  MD Orders: Evaluate and treat MEDICAL/REFERRING DIAGNOSIS:   Pain in left wrist [M25.532]   DATE OF ONSET: several months ago   REFERRING PHYSICIAN: Malinda Gasca MD  RETURN PHYSICIAN APPOINTMENT: 6 weeks     INITIAL ASSESSMENT:  Ms. Diego Hernandez presents with decreased functional use, strength and range of motion of her left wrist and upper extremity that is affecting her independence with activities of daily living and ability to perform job tasks. I feel that Ms. Diego Hernandez will benefit from skilled occupational therapy to maximize the functional use of her wrist and upper extremity in daily activities and work tasks. PLAN OF CARE:   PROBLEM LIST:  1. Pain in left wrist.  2. Decreased motion in left wrist.  3. Decreased strength in left hand. INTERVENTIONS PLANNED:  1. Modalities that may include fluidotherapy, paraffin, ultrasound, and light therapy. 2. Therapeutic exercise including a home exercise program.  3. Manual therapy. 4. Therapeutic activities. TREATMENT PLAN:  Effective Dates: 2017 TO 2018. Frequency/Duration: 1 time a week for 10 weeks  GOALS: (Goals have been discussed and agreed upon with patient.)  Short-Term Functional Goals: Time Frame: 4 weeks  1. Decrease pain to 4 to allow patient to perform self care tasks. 2. Increase motion in left wrist by 10 degrees to improve functional use of upper extremity in ADL activities.   3. Increase strength in left hand by 5 pounds to allow patient to  and lift objects during self care activities. Discharge Goals: Time Frame: 10 weeks  1. Decrease pain to 2 to allow patient to perform all household and work tasks. 2. Increase motion in left wrist by 20 degreees to allow patient to perform all ADL activities. 3. Increase strength in left hand by 8 pounds to allow patient to , lift, hold, and carry heavy objects. Rehabilitation Potential For Stated Goals: Good  Regarding Ancelmo Damien Ferro's therapy, I certify that the treatment plan above will be carried out by a therapist or under their direction. Thank you for this referral,  Karlie Last, OT       Referring Physician Signature: Rianna Hernandez MD _________________________  Date _________            The information in this section was collected on 11/1/2017 (except where otherwise noted). OCCUPATIONAL PROFILE & HISTORY:   History of Present Injury/Illness (Reason for Referral): The patient has developed increasing pain in her left wrist for several months. Past Medical History/Comorbidities:   Ms. Amber Huerta  has a past medical history of Allergic rhinitis; Asthma; Atrial fibrillation ([address] (11/7/2013); Chicken pox; Coronary artery disease; Depression; Essential hypertension, benign; Fatty liver; GERD (gastroesophageal reflux disease); Hyperlipidemia; Measles; Mitral stenosis; Osteoarthritis; Other disorder of calcium metabolism; Pneumonia (1984); Rheumatic fever; Rosacea; TIA (transient ischemic attack) (2013); and Unspecified hypothyroidism. Ms. Amber Huerta  has a past surgical history that includes gyn; refractive surgery (Bilateral); cardiac surg procedure unlist (2010); heart catheterization (2010); colonoscopy (2008); colonoscopy (2013); pacemaker (2013); and tonsillectomy (1983).   Social History/Living Environment:      Prior Level of Function/Work/Activity:  independent  Dominant Side:         RIGHT    Current Medications:    Current Outpatient Prescriptions:    Bacillus coagulans 250 million cell chew, Take  by mouth., Disp: , Rfl:     diclofenac (VOLTAREN) 1 % gel, Apply 4 g to affected area four (4) times daily. (Patient taking differently: Apply 4 g to affected area three (3) times daily.), Disp: 500 g, Rfl: 5    budesonide-formoterol (SYMBICORT) 160-4.5 mcg/actuation HFA inhaler, Take 2 Puffs by inhalation two (2) times a day., Disp: 3 Inhaler, Rfl: 3    clorazepate (TRANXENE) 7.5 mg tablet, Take 1 Tab by mouth two (2) times a day. Max Daily Amount: 15 mg. (Patient taking differently: Take 7.5 mg by mouth as needed.), Disp: 30 Tab, Rfl: 5    simvastatin (ZOCOR) 40 mg tablet, Take 1 Tab by mouth nightly., Disp: 90 Tab, Rfl: 3    warfarin (COUMADIN) 5 mg tablet, Take 1 Tab by mouth daily. , Disp: 90 Tab, Rfl: 3    montelukast (SINGULAIR) 10 mg tablet, Take 1 Tab by mouth daily. , Disp: 90 Tab, Rfl: 3    fluticasone (FLONASE) 50 mcg/actuation nasal spray, 2 sprays each nostril daily, Disp: 3 Bottle, Rfl: 3    albuterol (PROAIR HFA) 90 mcg/actuation inhaler, Take 2 Puffs by inhalation every four (4) hours as needed for Wheezing., Disp: 3 Inhaler, Rfl: 3    co-enzyme Q-10 (CO Q-10) 100 mg capsule, Take 100 mg by mouth daily. , Disp: , Rfl:     multivitamin (ONE A DAY) tablet, Take 1 Tab by mouth daily. , Disp: , Rfl:     acetaminophen (TYLENOL EXTRA STRENGTH) 500 mg tablet, Take  by mouth every six (6) hours as needed for Pain., Disp: , Rfl:     naproxen (NAPROSYN) 500 mg tablet, Take 500 mg by mouth as needed. , Disp: , Rfl:     B.infantis-B.ani-B.long-B.bifi (PROBIOTIC 4X) 10-15 mg TbEC, Take 1 Tab by mouth daily. , Disp: , Rfl:     aspirin delayed-release 81 mg tablet, Take 162 mg by mouth daily. , Disp: , Rfl:     Omega-3-DHA-EPA-Fish Oil 1,000 (120-180) mg cap, Take  by mouth., Disp: , Rfl:    Date Last Reviewed: 11/29/2017    Number of medical conditions (excluding presenting problem) that affect the Plan of Care: Brief history (0):  LOW COMPLEXITY ASSESSMENT OF OCCUPATIONAL PERFORMANCE:   RANGE OF MOTION:     · AROM: Left wrist motion is as follows: extension 50, flexion 35, U.D. 30, R.D. 15, supination 80, pronation 84 degrees. STRENGTH:   STRENGTH: Right 58 lbs. Left 52 lbs. LAT PINCH: Right 16 lbs. Left 14 lbs. SENSATION:  Intact           Physical Skills Involved:  1. Range of Motion  2. Strength  3. Pain (Chronic) Cognitive Skills Affected (resulting in the inability to perform in a timely and safe manner): 1. none Psychosocial Skills Affected:  1. none   Number of elements that affect the Plan of Care: 1-3:  LOW COMPLEXITY   CLINICAL DECISION MAKING:   Outcome Measure: Tool Used: Disabilities of the Arm, Shoulder and Hand (DASH) Questionnaire - Quick Version  Score:  Initial: 22/55  Most Recent: X/55 (Date: -- )   Interpretation of Score: The DASH is designed to measure the activities of daily living in person's with upper extremity dysfunction or pain. Each section is scored on a 1-5 scale, 5 representing the greatest disability. The scores of each section are added together for a total score of 55. Score 11 12-19 20-28 29-37 38-45 46-54 55   Modifier CH CI CJ CK CL CM CN     ? Carrying, Moving, and Handling Objects:     - CURRENT STATUS: CJ - 20%-39% impaired, limited or restricted    - GOAL STATUS: CI - 1%-19% impaired, limited or restricted    - D/C STATUS:  ---------------To be determined---------------      Medical Necessity:   · Patient is expected to demonstrate progress in strength and range of motion to increase independence with ADL,household and work activities. .  Reason for Services/Other Comments:  · Patient has limited motion ,strength and function in her left U.E..  Clinical Decision-Making Assessment:     Clinical Decision-Making: LOW COMPLEXITY   TREATMENT:   (In addition to Assessment/Re-Assessment sessions the following treatments were rendered)  Pre-treatment Symptoms/Complaints:  Pain and stiffness in left wrist.  Pain: Initial: Pain Intensity 1: 2  Pain Location 1: Hand, Wrist  Pain Orientation 1: Left    Post Session:  0     Patient was instructed in a home exercise program.  Patient stated \"My hand is a little sore from cutting up apples. \".    Manual Therapy: (Soft Tissue Mobilization Duration  Duration: 15 Minutes  Duration: 15 Minutes): Technique: Retrograde massage (followed by Light tx)  LUE Soft Tissue Mobilization: Yes  Technique: Retrograde massage (followed by Light tx)   Therapeutic Exercise:                                                                               : The patient's home exercise program was removed. Date:  11/9/17 Date:  11/16/17 Date:  11/22/17 Date:  11/29/17 Date:   Activity/Exercise Parameters Parameters Parameters Parameters Parameters   AROM during Fluidotherapy 20 min 20 min 20 min 20 min    Paraffin with Stretch   15 min 15 min 15 min 15 min    Retrograde massage, Friction Scar massage, Joint Mobilization   15 min  Light tx 15 min  Light tx 15 min  Light tx 15 min  Light tx    Scarf Curl   2 min 2 min 2 min 2 min    Washer Game 2 min 2 min 2 min 2 min    Individual Gripper          Hand [place]          Cones          Pegs          Clothes Pins          A-R Bar          Exerstick          Velcro-Roll                  RESISTIVE EXERCISES Weight/ Sets/Reps   Weight/ Sets/Reps Weight/ Sets/Reps Weight/ Sets/Reps Weight/ Sets/Reps   WEIGHT WELL        Sup/Pro        UD/RD        Wrist Flex/Ext        Free Weights          UBE(Minutes)          Nautilus        Compound Row        Vertical Chest        Overhead Press                    HEP: As above; handouts given to patient for all exercises.     Therapeutic Modalities:         Left Wrist Heat  Type: Paraffin bath  Duration: 15 minutes  Patient Position: Sitting                                     Joint Mobilization:        Treatment Times:  · Therapeutic Exercise: 15 minutes  · Manual Therapy: 15 minutes  · Parafin: 15 minutes  · Whirlpool:  minutes  · Other:  minutes       Treatment/Session Assessment:    · Response to Treatment:  Patients tolerated treatment well with no complications. Upon completion of treatment, skin condition was normal..  · Compliance with Program/Exercises: Will assess as treatment progresses. · Recommendations/Intent for next treatment session: \"Next visit will focus on advancements to more challenging activities\". Will continue per MD.  Total Treatment Duration:  OT Patient Time In/Time Out  Time In: 0730  Time Out: [address] [facility],

## 2017-12-06 ENCOUNTER — HOSPITAL ENCOUNTER (OUTPATIENT)
Dept: PHYSICAL THERAPY | Age: 62
Discharge: HOME | End: 2017-12-06
Attending: FAMILY MEDICINE
Payer: COMMERCIAL

## 2017-12-06 PROCEDURE — 97018 PARAFFIN BATH THERAPY: CPT

## 2017-12-06 PROCEDURE — 97110 THERAPEUTIC EXERCISES: CPT

## 2017-12-06 PROCEDURE — 97140 MANUAL THERAPY 1/> REGIONS: CPT

## 2017-12-06 NOTE — PROGRESS NOTES
Kaitlin Moya  : 1955 [facility] [place] [address] [facility]  [phone]   [phone]         OUTPATIENT OCCUPATIONAL THERAPY: Daily Note 2017    ICD-10: Treatment Diagnosis: Stiffness of left wrist, not elsewhere classified (M25.632)Pain in left wrist (M25.532)  Precautions/Allergies:   Codeine; Darvocet a500 [propoxyphene n-acetaminophen]; Iodinated contrast- oral and iv dye; and Phenergan [promethazine]   Fall Risk Score: 0 (? 5 = High Risk)  MD Orders: Evaluate and treat MEDICAL/REFERRING DIAGNOSIS:   Pain in left wrist [M25.532]   DATE OF ONSET: several months ago   REFERRING PHYSICIAN: Berna Zhang MD  RETURN PHYSICIAN APPOINTMENT: 6 weeks     INITIAL ASSESSMENT:  Ms. Luke Pemberton presents with decreased functional use, strength and range of motion of her left wrist and upper extremity that is affecting her independence with activities of daily living and ability to perform job tasks. I feel that Ms. Luke Pemberton will benefit from skilled occupational therapy to maximize the functional use of her wrist and upper extremity in daily activities and work tasks. PLAN OF CARE:   PROBLEM LIST:  1. Pain in left wrist.  2. Decreased motion in left wrist.  3. Decreased strength in left hand. INTERVENTIONS PLANNED:  1. Modalities that may include fluidotherapy, paraffin, ultrasound, and light therapy. 2. Therapeutic exercise including a home exercise program.  3. Manual therapy. 4. Therapeutic activities. TREATMENT PLAN:  Effective Dates: 2017 TO 2018. Frequency/Duration: 1 time a week for 10 weeks  GOALS: (Goals have been discussed and agreed upon with patient.)  Short-Term Functional Goals: Time Frame: 4 weeks  1. Decrease pain to 4 to allow patient to perform self care tasks. 2. Increase motion in left wrist by 10 degrees to improve functional use of upper extremity in ADL activities.   3. Increase strength in left hand by 5 pounds to allow patient to  and lift objects during self care activities. Discharge Goals: Time Frame: 10 weeks  1. Decrease pain to 2 to allow patient to perform all household and work tasks. 2. Increase motion in left wrist by 20 degreees to allow patient to perform all ADL activities. 3. Increase strength in left hand by 8 pounds to allow patient to , lift, hold, and carry heavy objects. Rehabilitation Potential For Stated Goals: Good  Regarding Aisha Schmidnirmal Kasie's therapy, I certify that the treatment plan above will be carried out by a therapist or under their direction. Thank you for this referral,  Theo Kramer, OT       Referring Physician Signature: Nohemy Webb MD _________________________  Date _________            The information in this section was collected on 11/1/2017 (except where otherwise noted). OCCUPATIONAL PROFILE & HISTORY:   History of Present Injury/Illness (Reason for Referral): The patient has developed increasing pain in her left wrist for several months. Past Medical History/Comorbidities:   Ms. Lorilee Meckel  has a past medical history of Allergic rhinitis; Asthma; Atrial fibrillation ([address] (11/7/2013); Chicken pox; Coronary artery disease; Depression; Essential hypertension, benign; Fatty liver; GERD (gastroesophageal reflux disease); Hyperlipidemia; Measles; Mitral stenosis; Osteoarthritis; Other disorder of calcium metabolism; Pneumonia (1984); Rheumatic fever; Rosacea; TIA (transient ischemic attack) (2013); and Unspecified hypothyroidism. Ms. Lorilee Meckel  has a past surgical history that includes gyn; refractive surgery (Bilateral); cardiac surg procedure unlist (2010); heart catheterization (2010); colonoscopy (2008); colonoscopy (2013); pacemaker (2013); and tonsillectomy (1983).   Social History/Living Environment:      Prior Level of Function/Work/Activity:  independent  Dominant Side:         RIGHT    Current Medications:    Current Outpatient Prescriptions:    acyclovir (ZOVIRAX) 400 mg tablet, Take 1 Tab by mouth two (2) times a day., Disp: 180 Tab, Rfl: 1    enoxaparin (LOVENOX) 80 mg/0.8 mL injection, 80 mg by SubCUTAneous route every twelve (12) hours. , Disp: 10 Syringe, Rfl: 1    Bacillus coagulans 250 million cell chew, Take  by mouth., Disp: , Rfl:     diclofenac (VOLTAREN) 1 % gel, Apply 4 g to affected area four (4) times daily. (Patient taking differently: Apply 4 g to affected area three (3) times daily.), Disp: 500 g, Rfl: 5    budesonide-formoterol (SYMBICORT) 160-4.5 mcg/actuation HFA inhaler, Take 2 Puffs by inhalation two (2) times a day., Disp: 3 Inhaler, Rfl: 3    clorazepate (TRANXENE) 7.5 mg tablet, Take 1 Tab by mouth two (2) times a day. Max Daily Amount: 15 mg. (Patient taking differently: Take 7.5 mg by mouth as needed.), Disp: 30 Tab, Rfl: 5    simvastatin (ZOCOR) 40 mg tablet, Take 1 Tab by mouth nightly., Disp: 90 Tab, Rfl: 3    warfarin (COUMADIN) 5 mg tablet, Take 1 Tab by mouth daily. , Disp: 90 Tab, Rfl: 3    montelukast (SINGULAIR) 10 mg tablet, Take 1 Tab by mouth daily. , Disp: 90 Tab, Rfl: 3    fluticasone (FLONASE) 50 mcg/actuation nasal spray, 2 sprays each nostril daily, Disp: 3 Bottle, Rfl: 3    albuterol (PROAIR HFA) 90 mcg/actuation inhaler, Take 2 Puffs by inhalation every four (4) hours as needed for Wheezing., Disp: 3 Inhaler, Rfl: 3    co-enzyme Q-10 (CO Q-10) 100 mg capsule, Take 100 mg by mouth daily. , Disp: , Rfl:     multivitamin (ONE A DAY) tablet, Take 1 Tab by mouth daily. , Disp: , Rfl:     acetaminophen (TYLENOL EXTRA STRENGTH) 500 mg tablet, Take  by mouth every six (6) hours as needed for Pain., Disp: , Rfl:     naproxen (NAPROSYN) 500 mg tablet, Take 500 mg by mouth as needed. , Disp: , Rfl:     B.infantis-B.ani-B.long-B.bifi (PROBIOTIC 4X) 10-15 mg TbEC, Take 1 Tab by mouth daily. , Disp: , Rfl:     aspirin delayed-release 81 mg tablet, Take 162 mg by mouth daily. , Disp: , Rfl:     Omega-3-DHA-EPA-Fish Oil 1,000 (120-180) mg cap, Take  by mouth., Disp: , Rfl:    Date Last Reviewed: 12/6/2017    Number of medical conditions (excluding presenting problem) that affect the Plan of Care: Brief history (0):  LOW COMPLEXITY   ASSESSMENT OF OCCUPATIONAL PERFORMANCE:   RANGE OF MOTION:     · AROM: Left wrist motion is as follows: extension 50, flexion 35, U.D. 30, R.D. 15, supination 80, pronation 84 degrees. STRENGTH:   STRENGTH: Right 58 lbs. Left 52 lbs. LAT PINCH: Right 16 lbs. Left 14 lbs. SENSATION:  Intact           Physical Skills Involved:  1. Range of Motion  2. Strength  3. Pain (Chronic) Cognitive Skills Affected (resulting in the inability to perform in a timely and safe manner): 1. none Psychosocial Skills Affected:  1. none   Number of elements that affect the Plan of Care: 1-3:  LOW COMPLEXITY   CLINICAL DECISION MAKING:   Outcome Measure: Tool Used: Disabilities of the Arm, Shoulder and Hand (DASH) Questionnaire - Quick Version  Score:  Initial: 22/55  Most Recent: X/55 (Date: -- )   Interpretation of Score: The DASH is designed to measure the activities of daily living in person's with upper extremity dysfunction or pain. Each section is scored on a 1-5 scale, 5 representing the greatest disability. The scores of each section are added together for a total score of 55. Score 11 12-19 20-28 29-37 38-45 46-54 55   Modifier CH CI CJ CK CL CM CN     ? Carrying, Moving, and Handling Objects:     - CURRENT STATUS: CJ - 20%-39% impaired, limited or restricted    - GOAL STATUS: CI - 1%-19% impaired, limited or restricted    - D/C STATUS:  ---------------To be determined---------------      Medical Necessity:   · Patient is expected to demonstrate progress in strength and range of motion to increase independence with ADL,household and work activities. .  Reason for Services/Other Comments:  · Patient has limited motion ,strength and function in her left U. E. .  Clinical Decision-Making Assessment:     Clinical Decision-Making: LOW COMPLEXITY   TREATMENT:   (In addition to Assessment/Re-Assessment sessions the following treatments were rendered)  Pre-treatment Symptoms/Complaints:  Pain and stiffness in left wrist.  Pain: Initial: Pain Intensity 1: 1  Pain Location 1: Hand, Wrist  Pain Orientation 1: Left    Post Session:  0     Patient was instructed in a home exercise program.  Patient stated \"My wrist is just a little sore. .\". Manual Therapy: (Soft Tissue Mobilization Duration  Duration: 15 Minutes  Duration: 15 Minutes): Technique: Retrograde massage (followed by Light tx)  LUE Soft Tissue Mobilization: Yes  Technique: Retrograde massage (followed by Light tx)   Therapeutic Exercise:                                                                               : The patient's home exercise program was removed.                                                 Date:  11/9/17 Date:  11/16/17 Date:  11/22/17 Date:  11/29/17 Date:  12/6/17   Activity/Exercise Parameters Parameters Parameters Parameters Parameters   AROM during Fluidotherapy 20 min 20 min 20 min 20 min 15 min   Paraffin with Stretch   15 min 15 min 15 min 15 min 15 min   Retrograde massage, Friction Scar massage, Joint Mobilization   15 min  Light tx 15 min  Light tx 15 min  Light tx 15 min  Light tx 15 min  Light tx   Scarf Curl   2 min 2 min 2 min 2 min 2 min   Washer Game 2 min 2 min 2 min 2 min 2 min   Individual Gripper          Hand [place]          Cones          Pegs          Clothes Pins          A-R Bar          Exerstick          Velcro-Roll                  RESISTIVE EXERCISES Weight/ Sets/Reps   Weight/ Sets/Reps Weight/ Sets/Reps Weight/ Sets/Reps Weight/ Sets/Reps   WEIGHT WELL        Sup/Pro        UD/RD        Wrist Flex/Ext        Free Weights          UBE(Minutes)          Nautilus        Compound Row        Vertical Chest        [employer]                    HEP: As above; handouts given to patient for all exercises. Therapeutic Modalities:         Left Wrist Heat  Type: Paraffin bath  Duration: 15 minutes  Patient Position: Sitting                                     Joint Mobilization:        Treatment Times:  · Therapeutic Exercise: 15 minutes  · Manual Therapy: 15 minutes  · Parafin: 15 minutes  · Whirlpool:  minutes  · Other:  minutes       Treatment/Session Assessment:    · Response to Treatment:  Patients tolerated treatment well with no complications. Upon completion of treatment, skin condition was normal..  · Compliance with Program/Exercises: Will assess as treatment progresses. · Recommendations/Intent for next treatment session: \"Next visit will focus on advancements to more challenging activities\". Will continue per MD.  Total Treatment Duration:  OT Patient Time In/Time Out  Time In: 0730  Time Out: [address],

## 2017-12-12 ENCOUNTER — HOSPITAL ENCOUNTER (OUTPATIENT)
Dept: PHYSICAL THERAPY | Age: 62
Discharge: HOME | End: 2017-12-12
Attending: FAMILY MEDICINE
Payer: COMMERCIAL

## 2017-12-12 PROCEDURE — 97018 PARAFFIN BATH THERAPY: CPT

## 2017-12-12 PROCEDURE — 97140 MANUAL THERAPY 1/> REGIONS: CPT

## 2017-12-12 PROCEDURE — 97110 THERAPEUTIC EXERCISES: CPT

## 2017-12-12 NOTE — PROGRESS NOTES
Mine Maza  : 1955 [facility] [place] [address] [facility], [address], [facility].  [phone]   [phone]         OUTPATIENT OCCUPATIONAL THERAPY: Daily Note 2017    ICD-10: Treatment Diagnosis: Stiffness of left wrist, not elsewhere classified (M25.632)Pain in left wrist (M25.532)  Precautions/Allergies:   Codeine; Darvocet a500 [propoxyphene n-acetaminophen]; Iodinated contrast- oral and iv dye; and Phenergan [promethazine]   Fall Risk Score: 0 (? 5 = High Risk)  MD Orders: Evaluate and treat MEDICAL/REFERRING DIAGNOSIS:   Pain in left wrist [M25.532]   DATE OF ONSET: several months ago   REFERRING PHYSICIAN: Jolane Cranker, MD  RETURN PHYSICIAN APPOINTMENT: 6 weeks     INITIAL ASSESSMENT:  Ms. Esperanza Bain presents with decreased functional use, strength and range of motion of her left wrist and upper extremity that is affecting her independence with activities of daily living and ability to perform job tasks. I feel that Ms. Esperanza Bain will benefit from skilled occupational therapy to maximize the functional use of her wrist and upper extremity in daily activities and work tasks. PLAN OF CARE:   PROBLEM LIST:  1. Pain in left wrist.  2. Decreased motion in left wrist.  3. Decreased strength in left hand. INTERVENTIONS PLANNED:  1. Modalities that may include fluidotherapy, paraffin, ultrasound, and light therapy. 2. Therapeutic exercise including a home exercise program.  3. Manual therapy. 4. Therapeutic activities. TREATMENT PLAN:  Effective Dates: 2017 TO 2018. Frequency/Duration: 1 time a week for 10 weeks  GOALS: (Goals have been discussed and agreed upon with patient.)  Short-Term Functional Goals: Time Frame: 4 weeks  1. Decrease pain to 4 to allow patient to perform self care tasks. 2. Increase motion in left wrist by 10 degrees to improve functional use of upper extremity in ADL activities.   3. Increase strength in left hand by 5 pounds to allow patient to  and lift objects during self care activities. Discharge Goals: Time Frame: 10 weeks  1. Decrease pain to 2 to allow patient to perform all household and work tasks. 2. Increase motion in left wrist by 20 degreees to allow patient to perform all ADL activities. 3. Increase strength in left hand by 8 pounds to allow patient to , lift, hold, and carry heavy objects. Rehabilitation Potential For Stated Goals: Good  Regarding Brittni Ferro's therapy, I certify that the treatment plan above will be carried out by a therapist or under their direction. Thank you for this referral,  Delano Bonilla, OT       Referring Physician Signature: Pamela Marsh MD _________________________  Date _________            The information in this section was collected on 11/1/2017 (except where otherwise noted). OCCUPATIONAL PROFILE & HISTORY:   History of Present Injury/Illness (Reason for Referral): The patient has developed increasing pain in her left wrist for several months. Past Medical History/Comorbidities:   Ms. Jesenia Liriano  has a past medical history of Allergic rhinitis; Asthma; Atrial fibrillation ([address] (11/7/2013); Chicken pox; Coronary artery disease; Depression; Essential hypertension, benign; Fatty liver; GERD (gastroesophageal reflux disease); Hyperlipidemia; Measles; Mitral stenosis; Osteoarthritis; Other disorder of calcium metabolism; Pneumonia (1984); Rheumatic fever; Rosacea; TIA (transient ischemic attack) (2013); and Unspecified hypothyroidism. Ms. Jesenia Liriano  has a past surgical history that includes gyn; refractive surgery (Bilateral); cardiac surg procedure unlist (2010); heart catheterization (2010); colonoscopy (2008); colonoscopy (2013); pacemaker (2013); and tonsillectomy (1983).   Social History/Living Environment:      Prior Level of Function/Work/Activity:  independent  Dominant Side:         RIGHT    Current Medications:    Current Outpatient Prescriptions:    acyclovir (ZOVIRAX) 400 mg tablet, Take 1 Tab by mouth two (2) times a day., Disp: 180 Tab, Rfl: 1    enoxaparin (LOVENOX) 80 mg/0.8 mL injection, 80 mg by SubCUTAneous route every twelve (12) hours. , Disp: 10 Syringe, Rfl: 1    Bacillus coagulans 250 million cell chew, Take  by mouth., Disp: , Rfl:     diclofenac (VOLTAREN) 1 % gel, Apply 4 g to affected area four (4) times daily. (Patient taking differently: Apply 4 g to affected area three (3) times daily.), Disp: 500 g, Rfl: 5    budesonide-formoterol (SYMBICORT) 160-4.5 mcg/actuation HFA inhaler, Take 2 Puffs by inhalation two (2) times a day., Disp: 3 Inhaler, Rfl: 3    clorazepate (TRANXENE) 7.5 mg tablet, Take 1 Tab by mouth two (2) times a day. Max Daily Amount: 15 mg. (Patient taking differently: Take 7.5 mg by mouth as needed.), Disp: 30 Tab, Rfl: 5    simvastatin (ZOCOR) 40 mg tablet, Take 1 Tab by mouth nightly., Disp: 90 Tab, Rfl: 3    warfarin (COUMADIN) 5 mg tablet, Take 1 Tab by mouth daily. , Disp: 90 Tab, Rfl: 3    montelukast (SINGULAIR) 10 mg tablet, Take 1 Tab by mouth daily. , Disp: 90 Tab, Rfl: 3    fluticasone (FLONASE) 50 mcg/actuation nasal spray, 2 sprays each nostril daily, Disp: 3 Bottle, Rfl: 3    albuterol (PROAIR HFA) 90 mcg/actuation inhaler, Take 2 Puffs by inhalation every four (4) hours as needed for Wheezing., Disp: 3 Inhaler, Rfl: 3    co-enzyme Q-10 (CO Q-10) 100 mg capsule, Take 100 mg by mouth daily. , Disp: , Rfl:     multivitamin (ONE A DAY) tablet, Take 1 Tab by mouth daily. , Disp: , Rfl:     acetaminophen (TYLENOL EXTRA STRENGTH) 500 mg tablet, Take  by mouth every six (6) hours as needed for Pain., Disp: , Rfl:     naproxen (NAPROSYN) 500 mg tablet, Take 500 mg by mouth as needed. , Disp: , Rfl:     B.infantis-B.ani-B.long-B.bifi (PROBIOTIC 4X) 10-15 mg TbEC, Take 1 Tab by mouth daily. , Disp: , Rfl:     aspirin delayed-release 81 mg tablet, Take 162 mg by mouth daily. , Disp: , Rfl:     Omega-3-DHA-EPA-Fish Oil 1,000 (120-180) mg cap, Take  by mouth., Disp: , Rfl:    Date Last Reviewed: 12/12/2017    Number of medical conditions (excluding presenting problem) that affect the Plan of Care: Brief history (0):  LOW COMPLEXITY   ASSESSMENT OF OCCUPATIONAL PERFORMANCE:   RANGE OF MOTION:     · AROM: Left wrist motion is as follows: extension 50, flexion 35, U.D. 30, R.D. 15, supination 80, pronation 84 degrees. STRENGTH:   STRENGTH: Right 58 lbs. Left 52 lbs. LAT PINCH: Right 16 lbs. Left 14 lbs. SENSATION:  Intact           Physical Skills Involved:  1. Range of Motion  2. Strength  3. Pain (Chronic) Cognitive Skills Affected (resulting in the inability to perform in a timely and safe manner): 1. none Psychosocial Skills Affected:  1. none   Number of elements that affect the Plan of Care: 1-3:  LOW COMPLEXITY   CLINICAL DECISION MAKING:   Outcome Measure: Tool Used: Disabilities of the Arm, Shoulder and Hand (DASH) Questionnaire - Quick Version  Score:  Initial: 22/55  Most Recent: X/55 (Date: -- )   Interpretation of Score: The DASH is designed to measure the activities of daily living in person's with upper extremity dysfunction or pain. Each section is scored on a 1-5 scale, 5 representing the greatest disability. The scores of each section are added together for a total score of 55. Score 11 12-19 20-28 29-37 38-45 46-54 55   Modifier CH CI CJ CK CL CM CN     ? Carrying, Moving, and Handling Objects:     - CURRENT STATUS: CJ - 20%-39% impaired, limited or restricted    - GOAL STATUS: CI - 1%-19% impaired, limited or restricted    - D/C STATUS:  ---------------To be determined---------------      Medical Necessity:   · Patient is expected to demonstrate progress in strength and range of motion to increase independence with ADL,household and work activities. .  Reason for Services/Other Comments:  · Patient has limited motion ,strength and function in her left U. E. .  Clinical Decision-Making Assessment:     Clinical Decision-Making: LOW COMPLEXITY   TREATMENT:   (In addition to Assessment/Re-Assessment sessions the following treatments were rendered)  Pre-treatment Symptoms/Complaints:  Pain and stiffness in left wrist.  Pain: Initial: Pain Intensity 1: 5  Pain Location 1: Hand, Wrist  Pain Orientation 1: Left    Post Session:  2     Patient was instructed in a home exercise program.  Patient stated \"I am still having pain. .\". Manual Therapy: (Soft Tissue Mobilization Duration  Duration: 15 Minutes  Duration: 15 Minutes): Technique: Retrograde massage (followed by Light tx)  LUE Soft Tissue Mobilization: Yes  Technique: Retrograde massage (followed by Light tx)   Therapeutic Exercise:                                                                               : The patient's home exercise program was removed.                                                 Date:  12/12/17 Date:  11/16/17 Date:  11/22/17 Date:  11/29/17 Date:  12/6/17   Activity/Exercise Parameters Parameters Parameters Parameters Parameters   AROM during Fluidotherapy 20 min 20 min 20 min 20 min 15 min   Paraffin with Stretch   15 min 15 min 15 min 15 min 15 min   Retrograde massage, Friction Scar massage, Joint Mobilization   15 min  Light tx 15 min  Light tx 15 min  Light tx 15 min  Light tx 15 min  Light tx   Scarf Curl   2 min 2 min 2 min 2 min 2 min   Washer Game 2 min 2 min 2 min 2 min 2 min   Individual Gripper          Hand [place]          Cones          Pegs          Clothes Pins          A-R Bar          Exerstick          Velcro-Roll                  RESISTIVE EXERCISES Weight/ Sets/Reps   Weight/ Sets/Reps Weight/ Sets/Reps Weight/ Sets/Reps Weight/ Sets/Reps   WEIGHT WELL        Sup/Pro        UD/RD        Wrist Flex/Ext        Free Weights          UBE(Minutes)          Nautilus        Compound Row        Vertical Chest        [facility]                    HEP: As above; handouts given to patient for all exercises. Therapeutic Modalities:         Left Wrist Heat  Type: Paraffin bath  Duration: 15 minutes  Patient Position: Sitting                                     Joint Mobilization:        Treatment Times:  · Therapeutic Exercise: 15 minutes  · Manual Therapy: 15 minutes  · Parafin: 15 minutes  · Whirlpool:  minutes  · Other:  minutes       Treatment/Session Assessment:    · Response to Treatment:  Patients tolerated treatment well with no complications. Upon completion of treatment, skin condition was normal..  · Compliance with Program/Exercises: Will assess as treatment progresses. · Recommendations/Intent for next treatment session: \"Next visit will focus on advancements to more challenging activities\". Will continue per MD.  Total Treatment Duration:  OT Patient Time In/Time Out  Time In: 0730  Time Out: [address] [facility]

## 2017-12-15 ENCOUNTER — ANESTHESIA EVENT (OUTPATIENT)
Dept: ENDOSCOPY | Age: 62
End: 2017-12-15
Payer: COMMERCIAL

## 2017-12-15 RX ORDER — OXYCODONE HYDROCHLORIDE 5 MG/1
5 TABLET ORAL
Status: CANCELLED | OUTPATIENT
Start: 2017-12-15

## 2017-12-15 RX ORDER — HYDROMORPHONE HYDROCHLORIDE 2 MG/ML
0.5 INJECTION, SOLUTION INTRAMUSCULAR; INTRAVENOUS; SUBCUTANEOUS
Status: CANCELLED | OUTPATIENT
Start: 2017-12-15

## 2017-12-15 RX ORDER — SODIUM CHLORIDE 0.9 % (FLUSH) 0.9 %
5-10 SYRINGE (ML) INJECTION AS NEEDED
Status: CANCELLED | OUTPATIENT
Start: 2017-12-15

## 2017-12-15 RX ORDER — OXYCODONE AND ACETAMINOPHEN 10; 325 MG/1; MG/1
1 TABLET ORAL AS NEEDED
Status: CANCELLED | OUTPATIENT
Start: 2017-12-15

## 2017-12-17 RX ORDER — SODIUM CHLORIDE 0.9 % (FLUSH) 0.9 %
5-10 SYRINGE (ML) INJECTION AS NEEDED
Status: CANCELLED | OUTPATIENT
Start: 2017-12-17

## 2017-12-17 RX ORDER — SODIUM CHLORIDE, SODIUM LACTATE, POTASSIUM CHLORIDE, CALCIUM CHLORIDE 600; 310; 30; 20 MG/100ML; MG/100ML; MG/100ML; MG/100ML
100 INJECTION, SOLUTION INTRAVENOUS CONTINUOUS
Status: CANCELLED | OUTPATIENT
Start: 2017-12-17

## 2017-12-18 ENCOUNTER — HOSPITAL ENCOUNTER (OUTPATIENT)
Age: 62
Setting detail: OUTPATIENT SURGERY
Discharge: HOME | End: 2017-12-18
Attending: INTERNAL MEDICINE | Admitting: INTERNAL MEDICINE
Payer: COMMERCIAL

## 2017-12-18 ENCOUNTER — ANESTHESIA (OUTPATIENT)
Dept: ENDOSCOPY | Age: 62
End: 2017-12-18
Payer: COMMERCIAL

## 2017-12-18 VITALS
OXYGEN SATURATION: 100 % | TEMPERATURE: 98.6 F | HEART RATE: 67 BPM | RESPIRATION RATE: 16 BRPM | WEIGHT: 156 LBS | BODY MASS INDEX: 28.71 KG/M2 | DIASTOLIC BLOOD PRESSURE: 73 MMHG | SYSTOLIC BLOOD PRESSURE: 162 MMHG | HEIGHT: 62 IN

## 2017-12-18 PROCEDURE — 74011250636 HC RX REV CODE- 250/636: Performed by: ANESTHESIOLOGY

## 2017-12-18 PROCEDURE — 74011000250 HC RX REV CODE- 250

## 2017-12-18 PROCEDURE — 74011250636 HC RX REV CODE- 250/636

## 2017-12-18 PROCEDURE — 76040000025: Performed by: INTERNAL MEDICINE

## 2017-12-18 PROCEDURE — 76060000031 HC ANESTHESIA FIRST 0.5 HR: Performed by: INTERNAL MEDICINE

## 2017-12-18 RX ORDER — PROPOFOL 10 MG/ML
INJECTION, EMULSION INTRAVENOUS
Status: DISCONTINUED | OUTPATIENT
Start: 2017-12-18 | End: 2017-12-18 | Stop reason: HOSPADM

## 2017-12-18 RX ORDER — LIDOCAINE HYDROCHLORIDE 20 MG/ML
INJECTION, SOLUTION EPIDURAL; INFILTRATION; INTRACAUDAL; PERINEURAL AS NEEDED
Status: DISCONTINUED | OUTPATIENT
Start: 2017-12-18 | End: 2017-12-18 | Stop reason: HOSPADM

## 2017-12-18 RX ORDER — SODIUM CHLORIDE, SODIUM LACTATE, POTASSIUM CHLORIDE, CALCIUM CHLORIDE 600; 310; 30; 20 MG/100ML; MG/100ML; MG/100ML; MG/100ML
100 INJECTION, SOLUTION INTRAVENOUS CONTINUOUS
Status: DISCONTINUED | OUTPATIENT
Start: 2017-12-18 | End: 2017-12-18 | Stop reason: HOSPADM

## 2017-12-18 RX ORDER — SODIUM CHLORIDE, SODIUM LACTATE, POTASSIUM CHLORIDE, CALCIUM CHLORIDE 600; 310; 30; 20 MG/100ML; MG/100ML; MG/100ML; MG/100ML
75 INJECTION, SOLUTION INTRAVENOUS CONTINUOUS
Status: DISCONTINUED | OUTPATIENT
Start: 2017-12-18 | End: 2017-12-18 | Stop reason: HOSPADM

## 2017-12-18 RX ORDER — PROPOFOL 10 MG/ML
INJECTION, EMULSION INTRAVENOUS AS NEEDED
Status: DISCONTINUED | OUTPATIENT
Start: 2017-12-18 | End: 2017-12-18 | Stop reason: HOSPADM

## 2017-12-18 RX ADMIN — PROPOFOL 50 MG: 10 INJECTION, EMULSION INTRAVENOUS at 08:40

## 2017-12-18 RX ADMIN — LIDOCAINE HYDROCHLORIDE 60 MG: 20 INJECTION, SOLUTION EPIDURAL; INFILTRATION; INTRACAUDAL; PERINEURAL at 08:40

## 2017-12-18 RX ADMIN — PROPOFOL 160 MCG/KG/MIN: 10 INJECTION, EMULSION INTRAVENOUS at 08:40

## 2017-12-18 RX ADMIN — SODIUM CHLORIDE, SODIUM LACTATE, POTASSIUM CHLORIDE, AND CALCIUM CHLORIDE: 600; 310; 30; 20 INJECTION, SOLUTION INTRAVENOUS at 08:36

## 2017-12-18 RX ADMIN — SODIUM CHLORIDE, SODIUM LACTATE, POTASSIUM CHLORIDE, AND CALCIUM CHLORIDE 100 ML/HR: 600; 310; 30; 20 INJECTION, SOLUTION INTRAVENOUS at 07:18

## 2017-12-18 NOTE — ANESTHESIA POSTPROCEDURE EVALUATION
Post-Anesthesia Evaluation and Assessment    Patient: Dennise Cooley MRN: [id number]  SSN: [id number]    YOB: 1955  Age: 57 y.o. Sex: female       Cardiovascular Function/Vital Signs  Visit Vitals    /70    Pulse 67    Temp 37 °C (98.6 °F)    Resp 16    Ht 5' 2\" (1.575 m)    Wt 70.8 kg (156 lb)    SpO2 97%    BMI 28.53 kg/m2       Patient is status post total IV anesthesia anesthesia for Procedure(s):  COLONOSCOPY/ 29  PT HAS PACEMAKER. Nausea/Vomiting: None    Postoperative hydration reviewed and adequate. Pain:  Pain Scale 1: Numeric (0 - 10) (12/18/17 0921)  Pain Intensity 1: 0 (12/18/17 0921)   Managed    Neurological Status: At baseline    Mental Status and Level of Consciousness: Arousable    Pulmonary Status:   O2 Device: Room air (12/18/17 0921)   Adequate oxygenation and airway patent    Complications related to anesthesia: None    Post-anesthesia assessment completed.  No concerns    Signed By: Crystal Mitchell MD     December 18, 2017

## 2017-12-18 NOTE — OP NOTES
COLONOSCOPY    DATE of PROCEDURE: 12/18/2017    MEDICATION:  MAC      INDICATIONS: abnormal virtual colonoscopy (CT scan)    INSTRUMENT: [id number]    PROCEDURE: After obtaining informed consent, the patient was placed in the left lateral position and sedated. The endoscope was advanced to the cecum where the appendiceal orifice and ileocecal valve were identified. On withdrawal, the colon was carefully inspected. Retroflexion was performed in the rectum. The patient was taken to the recovery area in stable condition. FINDINGS:  Prep quality was poor, worse in the right colon. Thick semi-solid debris which could not be effectively cleared. However, the prep in the left colon was adequate to exclude colon masses. The colonoscopy was otherwise normal.     Estimated blood loss: 0-minimal         IMPRESSION:  1. Poor prep, but otherwise normal.     PLAN:  1. Follow up with referring MD   2. Repeat colonoscopy in one year with 2 day prep vs. Repeat CT colonography based on patient preference    3.  Resume coumadin today     Karyle Burger, MD  [facility], [address]

## 2017-12-18 NOTE — ANESTHESIA PREPROCEDURE EVALUATION
Anesthetic History   No history of anesthetic complications            Review of Systems / Medical History  Patient summary reviewed and pertinent labs reviewed    Pulmonary            Asthma (daily inhalers)        Neuro/Psych         TIA     Cardiovascular      Valvular problems/murmurs (S/P MVR with mechanical valve): mitral stenosis and aortic stenosis      Dysrhythmias (A. fib)   Hyperlipidemia    Exercise tolerance: >4 METS  Comments: S/p MVR    Moderate AI in addition to mild-moderate stenosis of Mitral valve.    GI/Hepatic/Renal     GERD           Endo/Other      Hypothyroidism  Arthritis     Other Findings            Physical Exam    Airway  Mallampati: II  TM Distance: 4 - 6 cm  Neck ROM: normal range of motion   Mouth opening: Normal     Cardiovascular    Rhythm: irregular  Rate: normal      Pertinent negatives: No murmur, JVD and peripheral edema   Dental    Dentition: Caps/crowns     Pulmonary  Breath sounds clear to auscultation               Abdominal  GI exam deferred       Other Findings            Anesthetic Plan    ASA: 3  Anesthesia type: total IV anesthesia          Induction: Intravenous  Anesthetic plan and risks discussed with: Patient

## 2017-12-18 NOTE — DISCHARGE INSTRUCTIONS
Gastrointestinal Colonoscopy/Flexible Sigmoidoscopy - Lower Exam Discharge Instructions  1. Call Dr. Jose Bernabe at [phone] for any problems or questions. 2. Contact the doctors office for follow up appointment as directed  3. Medication may cause drowsiness for several hours, therefore, do not drive or operate machinery for remainder of the day. 4. No alcohol today. 5. Ordinarily, you may resume regular diet and activity after exam unless otherwise specified by your physician. 6. Because of air put into your colon during exam, you may experience some abdominal distension, relieved by the passage of gas, for several hours. 7. Contact your physician if you have any of the following:  a. Excessive amount of bleeding - large amount when having a bowel movement. Occasional specks of blood with bowel movement would not be unusual.  b. Severe abdominal pain  c. Fever or Chills    Any additional instructions:      1. Follow up with referring MD   2. Repeat colonoscopy in one year with 2 day prep vs. Repeat CT colonography based on patient preference    3. Resume coumadin today         Instructions given to Marta Gloria and other family members.   Instructions given by:  Jamie Kat RN

## 2017-12-18 NOTE — H&P
[facility] Pre Op H and P          Chief Complaint:  Abnormal CT     Subjective:     History of Present Illness:  Patient is a 57 y.o. Presents for colonoscopy for abnormal virtual colo    PMH:  Past Medical History:   Diagnosis Date    Allergic rhinitis     Asthma     daily inhalers    Atrial fibrillation (HCC)     Depression     Essential hypertension, benign     Fatty liver     GERD (gastroesophageal reflux disease)     Hyperlipidemia     Measles     Mitral stenosis     Osteoarthritis     Other disorder of calcium metabolism     Rheumatic fever     Rosacea     TIA (transient ischemic attack)     Unspecified hypothyroidism        PSH:  Past Surgical History:   Procedure Laterality Date    CARDIAC SURG PROCEDURE UNLIST  2010    MITRAL VALVE REPLACEMENT    HX COLONOSCOPY  2008    HX COLONOSCOPY  2013    CT colonography    HX GYN      uterine ablation    HX HEART CATHETERIZATION  2010    HX PACEMAKER  2013    [place]    HX REFRACTIVE SURGERY Bilateral     HX TONSILLECTOMY  1983       Allergies: Allergies   Allergen Reactions    Codeine Nausea and Vomiting    Darvocet A500 [Propoxyphene N-Acetaminophen] Anaphylaxis    Iodinated Contrast- Oral And Iv Dye Shortness of Breath    Phenergan [Promethazine] Drowsiness       Home Medications:  Prior to Admission medications    Medication Sig Start Date End Date Taking? Authorizing Provider   dicyclomine (BENTYL) 10 mg capsule Take 10 mg by mouth as needed. Yes Historical Provider   acyclovir (ZOVIRAX) 400 mg tablet Take 1 Tab by mouth two (2) times a day. 12/4/17  Yes Carlos Hays MD   budesonide-formoterol [facility]) 160-4.5 mcg/actuation HFA inhaler Take 2 Puffs by inhalation two (2) times a day. 7/19/17  Yes Carlos Hays MD   simvastatin (ZOCOR) 40 mg tablet Take 1 Tab by mouth nightly. 2/13/17  Yes Carlos Hays MD   montelukast (SINGULAIR) 10 mg tablet Take 1 Tab by mouth daily.   Patient taking differently: Take 10 mg by mouth nightly. 2/13/17  Yes Inis Goodell, MD   albuterol (PROAIR HFA) 90 mcg/actuation inhaler Take 2 Puffs by inhalation every four (4) hours as needed for Wheezing. Patient taking differently: Take 2 Puffs by inhalation as needed for Wheezing. 2/13/17  Yes Inis Goodell, MD   aspirin delayed-release 81 mg tablet Take 162 mg by mouth daily. Yes Historical Provider   Omega-3-DHA-EPA-Fish Oil 1,000 (120-180) mg cap Take  by mouth. Yes Historical Provider   enoxaparin (LOVENOX) 80 mg/0.8 mL injection 80 mg by SubCUTAneous route every twelve (12) hours. Patient taking differently: 80 mg by SubCUTAneous route every twelve (12) hours. Lovenox bridge: 11/30/17   Inis Goodell, MD   clorazepate (TRANXENE) 7.5 mg tablet Take 1 Tab by mouth two (2) times a day. Max Daily Amount: 15 mg. Patient taking differently: Take 7.5 mg by mouth as needed. 3/13/17   Juventino Mckeon MD   warfarin (COUMADIN) 5 mg tablet Take 1 Tab by mouth daily. Patient taking differently: Take 5 mg by mouth nightly. 2/13/17   Inis Goodell, MD   fluticasone (FLONASE) 50 mcg/actuation nasal spray 2 sprays each nostril daily  Patient taking differently: 2 Sprays by Both Nostrils route two (2) times a day. 2 sprays each nostril daily 2/13/17   Inis Goodell, MD   co-enzyme Q-10 (CO Q-10) 100 mg capsule Take 100 mg by mouth daily. Historical Provider   multivitamin (ONE A DAY) tablet Take 1 Tab by mouth daily. Historical Provider   acetaminophen (TYLENOL EXTRA STRENGTH) 500 mg tablet Take  by mouth as needed for Pain. Historical Provider   B.infantis-B.ani-B.long-B.bifi (PROBIOTIC 4X) 10-15 mg TbEC Take 1 Tab by mouth daily.     Historical Provider       Hospital Medications:  Current Facility-Administered Medications   Medication Dose Route Frequency    lactated Ringers infusion  75 mL/hr IntraVENous CONTINUOUS    lactated Ringers infusion  100 mL/hr IntraVENous CONTINUOUS       Social History:  Social History Substance Use Topics    Smoking status: Former Smoker     Packs/day: 1.00     Years: 12.00     Types: Cigarettes     Quit date: 1/1/1991    Smokeless tobacco: Never Used    Alcohol use 0.0 oz/week     0 Standard drinks or equivalent per week      Comment: one drink a month     Pt denies any history of IV drug use, blood transfusions, tattoos     Family History:  Family History   Problem Relation Age of Onset    Heart Attack Father     Obesity Father     Hypertension Father     Obesity Mother     Colon Cancer Mother        Review of Systems:  A detailed 10 system ROS is obtained, with pertinent positives as listed above. All others are negative. Diet:      Objective:     Physical Exam:  Vitals:  Visit Vitals    /80    Pulse 75    Temp 98.5 °F (36.9 °C)    Resp 18    Ht 5' 2\" (1.575 m)    Wt 70.8 kg (156 lb)    SpO2 95%    BMI 28.53 kg/m2     Gen:  Pt is alert, cooperative, no acute distress  Skin:  Extremities and face reveal no rashes. HEENT: Sclerae anicteric. Extra-occular muscles are intact. No oral ulcers. The neck is supple. Cardiovascular: BRIDGET  Respiratory:  Comfortable breathing with no accessory muscle use. Clear breath sounds anteriorly with no wheezes, rales, or rhonchi. GI:  Abdomen nondistended, soft, and nontender. Normal active bowel sounds. No masses palpable. Musculoskeletal:  Extremities have good range of motion. No costovertebral tenderness. Neurological:  Gross memory appears intact. Patient is alert and oriented. Psychiatric:  Mood appears appropriate with judgement intact. Lymphatic:  No cervical or supraclavicular adenopathy. Assessment:       Active Problems:    * No active hospital problems. *      Plan:       Colonoscopy as planned.  ASA III MP II

## 2017-12-18 NOTE — IP AVS SNAPSHOT
Cathryn Bony 
 
 
 [address] 
[phone] Patient: Angelina Tipton MRN: [id number] CZO:[id number] About your hospitalization You were admitted on:  December 18, 2017 You last received care in the:  [facility] You were discharged on:  December 18, 2017 Why you were hospitalized Your primary diagnosis was:  Not on File Things You Need To Do (next 8 weeks) Tuesday Dec 19, 2017 [facility] OT RECURRING HAND with Lb Harrison OT at  2:00 PM  
PLEASE NOTE THAT YOUR APPOINTMENT MAY LAST MORE THAN 1 HOUR. Where:  [facility] ([address] [place] [address]) Thursday Jan 04, 2018 [facility] OT RECURRING HAND with Lb Harrison OT at  7:30 AM  
PLEASE NOTE THAT YOUR APPOINTMENT MAY LAST MORE THAN 1 HOUR. Where:  [facility] ([address] [place] [address]) Discharge Orders None A check vito indicates which time of day the medication should be taken. My Medications ASK your physician about these medications Instructions Each Dose to Equal  
 Morning Noon Evening Bedtime  
 acyclovir 400 mg tablet Commonly known as:  ZOVIRAX Your last dose was: Your next dose is: Take 1 Tab by mouth two (2) times a day. 400 mg  
    
   
   
   
  
 albuterol 90 mcg/actuation inhaler Commonly known as:  PROAIR HFA Your last dose was: Your next dose is: Take 2 Puffs by inhalation every four (4) hours as needed for Wheezing. 2 Puff  
    
   
   
   
  
 aspirin delayed-release 81 mg tablet Your last dose was: Your next dose is: Take 162 mg by mouth daily. 162 mg  
    
   
   
   
  
 budesonide-formoterol 160-4.5 mcg/actuation Hfaa Commonly known as:  SYMBICORT Your last dose was: Your next dose is: Take 2 Puffs by inhalation two (2) times a day. 2 Puff clorazepate 7.5 mg tablet Commonly known as:  TRANXENE Your last dose was: Your next dose is: Take 1 Tab by mouth two (2) times a day. Max Daily Amount: 15 mg.  
 7.5 mg  
    
   
   
   
  
 co-enzyme Q-10 100 mg capsule Commonly known as:  CO Q-10 Your last dose was: Your next dose is: Take 100 mg by mouth daily. 100 mg  
    
   
   
   
  
 dicyclomine 10 mg capsule Commonly known as:  BENTYL Your last dose was: Your next dose is: Take 10 mg by mouth as needed. 10 mg  
    
   
   
   
  
 enoxaparin 80 mg/0.8 mL injection Commonly known as:  LOVENOX Your last dose was: Your next dose is:    
   
   
 80 mg by SubCUTAneous route every twelve (12) hours. 80 mg  
    
   
   
   
  
 fluticasone 50 mcg/actuation nasal spray Commonly known as:  Michaelyn Alvin Your last dose was: Your next dose is:    
   
   
 2 sprays each nostril daily  
     
   
   
   
  
 montelukast 10 mg tablet Commonly known as:  SINGULAIR Your last dose was: Your next dose is: Take 1 Tab by mouth daily. 10 mg  
    
   
   
   
  
 multivitamin tablet Commonly known as:  ONE A DAY Your last dose was: Your next dose is: Take 1 Tab by mouth daily. 1 Tab Omega-3-DHA-EPA-Fish Oil 1,000 mg (120 mg-180 mg) Cap Your last dose was: Your next dose is: Take  by mouth. PROBIOTIC 4X 10-15 mg Tbec Generic drug:  B.infantis-B.ani-B.long-B.bifi Your last dose was: Your next dose is: Take 1 Tab by mouth daily. 1 Tab  
    
   
   
   
  
 simvastatin 40 mg tablet Commonly known as:  ZOCOR Your last dose was: Your next dose is: Take 1 Tab by mouth nightly. 40 mg  
    
   
   
   
  
 TYLENOL EXTRA STRENGTH 500 mg tablet Generic drug:  acetaminophen Your last dose was: Your next dose is: Take  by mouth as needed for Pain.  
     
   
   
   
  
 warfarin 5 mg tablet Commonly known as:  COUMADIN Your last dose was: Your next dose is: Take 1 Tab by mouth daily. 5 mg Discharge Instructions Gastrointestinal Colonoscopy/Flexible Sigmoidoscopy - Lower Exam Discharge Instructions 1. Call Dr. Kraig Sánchez at [phone] for any problems or questions. 2. Contact the doctors office for follow up appointment as directed 3. Medication may cause drowsiness for several hours, therefore, do not drive or operate machinery for remainder of the day. 4. No alcohol today. 5. Ordinarily, you may resume regular diet and activity after exam unless otherwise specified by your physician. 6. Because of air put into your colon during exam, you may experience some abdominal distension, relieved by the passage of gas, for several hours. 7. Contact your physician if you have any of the following: 
a. Excessive amount of bleeding  large amount when having a bowel movement. Occasional specks of blood with bowel movement would not be unusual. 
b. Severe abdominal pain 
c. Fever or Chills Any additional instructions:   
 
1. Follow up with referring MD  
2. Repeat colonoscopy in one year with 2 day prep vs. Repeat CT colonography based on patient preference 3. Resume coumadin today Instructions given to Legrand Siemens and other family members. Instructions given by:  Roberta Bah RN Introducing [facility]! Dear Racquel Strauss: 
Thank you for requesting a Strav[employer] account. Our records indicate that you already have an active Strava account. You can access your account anytime at [email or web address] Did you know that you can access your hospital and ER discharge instructions at any time in [employer]? You can also review all of your test results from your hospital stay or ER visit. Additional Information If you have questions, please visit the Frequently Asked Questions section of the [employer] website at [email or web address] [place] [employer]/. Remember, [employer] is NOT to be used for urgent needs. For medical emergencies, dial 911. Now available from your iPhone and Android! Providers Seen During Your Hospitalization Provider Specialty Primary office phone Teodoro Davila MD Gastroenterology [phone] Your Primary Care Physician (PCP) Primary Care Physician Office Phone Office Fax Palomo Kimbrough [phone] You are allergic to the following Allergen Reactions Codeine Nausea and Vomiting Darvocet A500 (Propoxyphene N-Acetaminophen) Anaphylaxis Iodinated Contrast- Oral And Iv Dye Shortness of Breath Phenergan (Promethazine) Drowsiness Recent Documentation Height Weight BMI OB Status Smoking Status 1.575 m 70.8 kg 28.53 kg/m2 Postmenopausal Former Smoker Emergency Contacts Name Discharge Info Relation Home Work Mobile SebastienGood DISCHARGE CAREGIVER [3] Spouse [3] [phone] Patient Belongings The following personal items are in your possession at time of discharge: 
  Dental Appliances: None  Visual Aid: Glasses Please provide this summary of care documentation to your next provider. Signatures-by signing, you are acknowledging that this After Visit Summary has been reviewed with you and you have received a copy. Patient Signature:  ____________________________________________________________ Date:  ____________________________________________________________  
  
Je Manuel Provider Signature:  ____________________________________________________________ Date:  ____________________________________________________________

## 2017-12-19 ENCOUNTER — HOSPITAL ENCOUNTER (OUTPATIENT)
Dept: PHYSICAL THERAPY | Age: 62
Discharge: HOME | End: 2017-12-19
Attending: FAMILY MEDICINE
Payer: COMMERCIAL

## 2018-01-04 ENCOUNTER — HOSPITAL ENCOUNTER (OUTPATIENT)
Dept: PHYSICAL THERAPY | Age: 63
Discharge: HOME | End: 2018-01-04
Attending: FAMILY MEDICINE
Payer: COMMERCIAL

## 2018-01-04 PROCEDURE — 97140 MANUAL THERAPY 1/> REGIONS: CPT

## 2018-01-04 PROCEDURE — 97018 PARAFFIN BATH THERAPY: CPT

## 2018-01-04 PROCEDURE — 97110 THERAPEUTIC EXERCISES: CPT

## 2018-01-04 NOTE — PROGRESS NOTES
Jonathan Ya  : 1955 [facility] at [address], [address] [place] [address],[address] [facility], [place] [address].  [phone]   [phone]         OUTPATIENT OCCUPATIONAL THERAPY: Daily Note 2018    ICD-10: Treatment Diagnosis: Stiffness of left wrist, not elsewhere classified (M25.632)Pain in left wrist (M25.532)  Precautions/Allergies:   Codeine; Darvocet a500 [propoxyphene n-acetaminophen]; Iodinated contrast- oral and iv dye; and Phenergan [promethazine]   Fall Risk Score: 0 (? 5 = High Risk)  MD Orders: Evaluate and treat MEDICAL/REFERRING DIAGNOSIS:   Pain in left wrist [M25.532]   DATE OF ONSET: several months ago   REFERRING PHYSICIAN: Ap Arceo MD  RETURN PHYSICIAN APPOINTMENT: 6 weeks     INITIAL ASSESSMENT:  Ms. Jarett An presents with decreased functional use, strength and range of motion of her left wrist and upper extremity that is affecting her independence with activities of daily living and ability to perform job tasks. I feel that Ms. Jarett An will benefit from skilled occupational therapy to maximize the functional use of her wrist and upper extremity in daily activities and work tasks. PLAN OF CARE:   PROBLEM LIST:  1. Pain in left wrist.  2. Decreased motion in left wrist.  3. Decreased strength in left hand. INTERVENTIONS PLANNED:  1. Modalities that may include fluidotherapy, paraffin, ultrasound, and light therapy. 2. Therapeutic exercise including a home exercise program.  3. Manual therapy. 4. Therapeutic activities. TREATMENT PLAN:  Effective Dates: 2017 TO 2018. Frequency/Duration: 1 time a week for 10 weeks  GOALS: (Goals have been discussed and agreed upon with patient.)  Short-Term Functional Goals: Time Frame: 4 weeks  1. Decrease pain to 4 to allow patient to perform self care tasks. 2. Increase motion in left wrist by 10 degrees to improve functional use of upper extremity in ADL activities.   3. Increase strength in left hand by 5 pounds to allow patient to  and lift objects during self care activities. Discharge Goals: Time Frame: 10 weeks  1. Decrease pain to 2 to allow patient to perform all household and work tasks. 2. Increase motion in left wrist by 20 degreees to allow patient to perform all ADL activities. 3. Increase strength in left hand by 8 pounds to allow patient to , lift, hold, and carry heavy objects. Rehabilitation Potential For Stated Goals: Good  Regarding Meek Garcia Kasie's therapy, I certify that the treatment plan above will be carried out by a therapist or under their direction. Thank you for this referral,  John Zayas OT       Referring Physician Signature: Keith Matute MD _________________________  Date _________            The information in this section was collected on 11/1/2017 (except where otherwise noted). OCCUPATIONAL PROFILE & HISTORY:   History of Present Injury/Illness (Reason for Referral): The patient has developed increasing pain in her left wrist for several months. Past Medical History/Comorbidities:   Ms. Rachelle Murphy  has a past medical history of Allergic rhinitis; Asthma; Atrial fibrillation ([address] Depression; Essential hypertension, benign; Fatty liver; GERD (gastroesophageal reflux disease); Hyperlipidemia; Measles; Mitral stenosis; Osteoarthritis; Other disorder of calcium metabolism; Rheumatic fever; Rosacea; TIA (transient ischemic attack); and Unspecified hypothyroidism. Ms. Rachelle Murphy  has a past surgical history that includes hx refractive surgery (Bilateral); hx colonoscopy (2008); hx colonoscopy (2013); hx pacemaker (2013); pr cardiac surg procedure unlist (2010); hx heart catheterization (2010); hx gyn; hx tonsillectomy (1983); and colonoscopy (N/A, 12/18/2017).   Social History/Living Environment:      Prior Level of Function/Work/Activity:  independent  Dominant Side:         RIGHT    Current Medications:    Current Outpatient Prescriptions:     dicyclomine (BENTYL) 10 mg capsule, Take 10 mg by mouth as needed. , Disp: , Rfl:     acyclovir (ZOVIRAX) 400 mg tablet, Take 1 Tab by mouth two (2) times a day., Disp: 180 Tab, Rfl: 1    enoxaparin (LOVENOX) 80 mg/0.8 mL injection, 80 mg by SubCUTAneous route every twelve (12) hours. (Patient taking differently: 80 mg by SubCUTAneous route every twelve (12) hours. Lovenox bridge:), Disp: 10 Syringe, Rfl: 1    budesonide-formoterol (SYMBICORT) 160-4.5 mcg/actuation HFA inhaler, Take 2 Puffs by inhalation two (2) times a day., Disp: 3 Inhaler, Rfl: 3    clorazepate (TRANXENE) 7.5 mg tablet, Take 1 Tab by mouth two (2) times a day. Max Daily Amount: 15 mg. (Patient taking differently: Take 7.5 mg by mouth as needed.), Disp: 30 Tab, Rfl: 5    simvastatin (ZOCOR) 40 mg tablet, Take 1 Tab by mouth nightly., Disp: 90 Tab, Rfl: 3    warfarin (COUMADIN) 5 mg tablet, Take 1 Tab by mouth daily. (Patient taking differently: Take 5 mg by mouth nightly.), Disp: 90 Tab, Rfl: 3    montelukast (SINGULAIR) 10 mg tablet, Take 1 Tab by mouth daily. (Patient taking differently: Take 10 mg by mouth nightly.), Disp: 90 Tab, Rfl: 3    fluticasone (FLONASE) 50 mcg/actuation nasal spray, 2 sprays each nostril daily (Patient taking differently: 2 Sprays by Both Nostrils route two (2) times a day. 2 sprays each nostril daily), Disp: 3 Bottle, Rfl: 3    albuterol (PROAIR HFA) 90 mcg/actuation inhaler, Take 2 Puffs by inhalation every four (4) hours as needed for Wheezing. (Patient taking differently: Take 2 Puffs by inhalation as needed for Wheezing.), Disp: 3 Inhaler, Rfl: 3    co-enzyme Q-10 (CO Q-10) 100 mg capsule, Take 100 mg by mouth daily. , Disp: , Rfl:     multivitamin (ONE A DAY) tablet, Take 1 Tab by mouth daily. , Disp: , Rfl:     acetaminophen (TYLENOL EXTRA STRENGTH) 500 mg tablet, Take  by mouth as needed for Pain., Disp: , Rfl:     B.infantis-B.ani-B.long-B.bifi (PROBIOTIC 4X) 10-15 mg TbEC, Take 1 Tab by mouth daily. , Disp: , Rfl:    aspirin delayed-release 81 mg tablet, Take 162 mg by mouth daily. , Disp: , Rfl:     Omega-3-DHA-EPA-Fish Oil 1,000 (120-180) mg cap, Take  by mouth., Disp: , Rfl:    Date Last Reviewed: 1/4/2018    Number of medical conditions (excluding presenting problem) that affect the Plan of Care: Brief history (0):  LOW COMPLEXITY   ASSESSMENT OF OCCUPATIONAL PERFORMANCE:   RANGE OF MOTION:     · AROM: Left wrist motion is as follows: extension 50, flexion 35, U.D. 30, R.D. 15, supination 80, pronation 84 degrees. STRENGTH:   STRENGTH: Right 58 lbs. Left 52 lbs. LAT PINCH: Right 16 lbs. Left 14 lbs. SENSATION:  Intact           Physical Skills Involved:  1. Range of Motion  2. Strength  3. Pain (Chronic) Cognitive Skills Affected (resulting in the inability to perform in a timely and safe manner): 1. none Psychosocial Skills Affected:  1. none   Number of elements that affect the Plan of Care: 1-3:  LOW COMPLEXITY   CLINICAL DECISION MAKING:   Outcome Measure: Tool Used: Disabilities of the Arm, Shoulder and Hand (DASH) Questionnaire - Quick Version  Score:  Initial: 22/55  Most Recent: X/55 (Date: -- )   Interpretation of Score: The DASH is designed to measure the activities of daily living in person's with upper extremity dysfunction or pain. Each section is scored on a 1-5 scale, 5 representing the greatest disability. The scores of each section are added together for a total score of 55. Score 11 12-19 20-28 29-37 38-45 46-54 55   Modifier CH CI CJ CK CL CM CN     ?  Carrying, Moving, and Handling Objects:     - CURRENT STATUS: CJ - 20%-39% impaired, limited or restricted    - GOAL STATUS: CI - 1%-19% impaired, limited or restricted    - D/C STATUS:  ---------------To be determined---------------      Medical Necessity:   · Patient is expected to demonstrate progress in strength and range of motion to increase independence with ADL,household and work activities. .  Reason for Services/Other Comments:  · Patient has limited motion ,strength and function in her left U.E..  Clinical Decision-Making Assessment:     Clinical Decision-Making: LOW COMPLEXITY   TREATMENT:   (In addition to Assessment/Re-Assessment sessions the following treatments were rendered)  Pre-treatment Symptoms/Complaints:  Pain and stiffness in left wrist.  Pain: Initial: Pain Intensity 1: 1  Pain Location 1: Wrist, Hand  Pain Orientation 1: Left    Post Session:  0     Patient was instructed in a home exercise program.  Patient stated \"My pain has leveled off a little. [place] \". Manual Therapy: (Soft Tissue Mobilization Duration  Duration: 15 Minutes  Duration: 15 Minutes): Technique: Retrograde massage (followed by Light tx)  LUE Soft Tissue Mobilization: Yes  Technique: Retrograde massage (followed by Light tx)   Therapeutic Exercise:                                                                               : The patient's home exercise program was removed.                                                 Date:  12/11/17 Date:  1/4/18 Date:  11/22/17 Date:  11/29/17 Date:  12/6/17   Activity/Exercise Parameters Parameters Parameters Parameters Parameters   AROM during Fluidotherapy 20 min 20 min 20 min 20 min 15 min   Paraffin with Stretch   15 min 15 min 15 min 15 min 15 min   Retrograde massage, Friction Scar massage, Joint Mobilization   15 min  Light tx 15 min  Light tx 15 min  Light tx 15 min  Light tx 15 min  Light tx   Scarf Curl   2 min 2 min 2 min 2 min 2 min   Washer Game 2 min 2 min 2 min 2 min 2 min   Individual Gripper          Hand [place]          Cones          Pegs          Clothes Pins          A-R Bar          Exerstick          Velcro-Roll                  RESISTIVE EXERCISES Weight/ Sets/Reps   Weight/ Sets/Reps Weight/ Sets/Reps Weight/ Sets/Reps Weight/ Sets/Reps   WEIGHT WELL        Sup/Pro        UD/RD        Wrist Flex/Ext        Free Weights          UBE(Minutes) Shi        Compound Row        Vertical Chest        [employer]                    HEP: As above; handouts given to patient for all exercises. Therapeutic Modalities:         Left Wrist Heat  Type: Paraffin bath  Duration: 15 minutes  Patient Position: Sitting                                     Joint Mobilization:        Treatment Times:  · Therapeutic Exercise: 15 minutes  · Manual Therapy: 15 minutes  · Parafin: 15 minutes  · Whirlpool:  minutes  · Other:  minutes       Treatment/Session Assessment:    · Response to Treatment:  Patients tolerated treatment well with no complications. Upon completion of treatment, skin condition was normal..  · Compliance with Program/Exercises: Will assess as treatment progresses. · Recommendations/Intent for next treatment session: \"Next visit will focus on advancements to more challenging activities\". Will continue per MD.  Total Treatment Duration:  OT Patient Time In/Time Out  Time In: 0730  Time Out: 0811 [facility],

## 2018-01-06 PROBLEM — F33.9 RECURRENT DEPRESSION (HCC): Status: ACTIVE | Noted: 2018-01-06

## 2018-01-10 ENCOUNTER — HOSPITAL ENCOUNTER (OUTPATIENT)
Dept: PHYSICAL THERAPY | Age: 63
Discharge: HOME | End: 2018-01-10
Attending: FAMILY MEDICINE
Payer: COMMERCIAL

## 2018-01-10 PROCEDURE — 97018 PARAFFIN BATH THERAPY: CPT

## 2018-01-10 PROCEDURE — 97140 MANUAL THERAPY 1/> REGIONS: CPT

## 2018-01-10 PROCEDURE — 97110 THERAPEUTIC EXERCISES: CPT

## 2018-01-10 NOTE — PROGRESS NOTES
Rosemary Mcleod  : 1955 [facility] [place] [address] [facility], [place] [address].  [phone]   [phone]         OUTPATIENT OCCUPATIONAL THERAPY: Daily Note 1/10/2018    ICD-10: Treatment Diagnosis: Stiffness of left wrist, not elsewhere classified (M25.632)Pain in left wrist (M25.532)  Precautions/Allergies:   Codeine; Darvocet a500 [propoxyphene n-acetaminophen]; Iodinated contrast- oral and iv dye; and Phenergan [promethazine]   Fall Risk Score: 0 (? 5 = High Risk)  MD Orders: Evaluate and treat MEDICAL/REFERRING DIAGNOSIS:   Pain in left wrist [M25.532]   DATE OF ONSET: several months ago   REFERRING PHYSICIAN: Kristin Arroyo MD  RETURN PHYSICIAN APPOINTMENT: 6 weeks     INITIAL ASSESSMENT:  Ms. Tammie Matamoros presents with decreased functional use, strength and range of motion of her left wrist and upper extremity that is affecting her independence with activities of daily living and ability to perform job tasks. I feel that Ms. Tammie Matamoros will benefit from skilled occupational therapy to maximize the functional use of her wrist and upper extremity in daily activities and work tasks. PLAN OF CARE:   PROBLEM LIST:  1. Pain in left wrist.  2. Decreased motion in left wrist.  3. Decreased strength in left hand. INTERVENTIONS PLANNED:  1. Modalities that may include fluidotherapy, paraffin, ultrasound, and light therapy. 2. Therapeutic exercise including a home exercise program.  3. Manual therapy. 4. Therapeutic activities. TREATMENT PLAN:  Effective Dates: 2017 TO 2018. Frequency/Duration: 1 time a week for 10 weeks  GOALS: (Goals have been discussed and agreed upon with patient.)  Short-Term Functional Goals: Time Frame: 4 weeks  1. Decrease pain to 4 to allow patient to perform self care tasks. 2. Increase motion in left wrist by 10 degrees to improve functional use of upper extremity in ADL activities.   3. Increase strength in left hand by 5 pounds to allow patient to  and lift objects during self care activities. Discharge Goals: Time Frame: 10 weeks  1. Decrease pain to 2 to allow patient to perform all household and work tasks. 2. Increase motion in left wrist by 20 degreees to allow patient to perform all ADL activities. 3. Increase strength in left hand by 8 pounds to allow patient to , lift, hold, and carry heavy objects. Rehabilitation Potential For Stated Goals: Good  Regarding Ella Ferro's therapy, I certify that the treatment plan above will be carried out by a therapist or under their direction. Thank you for this referral,  Estelle Laws, OT       Referring Physician Signature: Elisha Mariano MD _________________________  Date _________            The information in this section was collected on 11/1/2017 (except where otherwise noted). OCCUPATIONAL PROFILE & HISTORY:   History of Present Injury/Illness (Reason for Referral): The patient has developed increasing pain in her left wrist for several months. Past Medical History/Comorbidities:   Ms. Jeremy Sawyer  has a past medical history of Allergic rhinitis; Asthma; Atrial fibrillation ([address] Depression; Essential hypertension, benign; Fatty liver; GERD (gastroesophageal reflux disease); Hyperlipidemia; Measles; Mitral stenosis; Osteoarthritis; Other disorder of calcium metabolism; Rheumatic fever; Rosacea; TIA (transient ischemic attack); and Unspecified hypothyroidism. Ms. Jeremy Sawyer  has a past surgical history that includes hx refractive surgery (Bilateral); hx colonoscopy (2008); hx colonoscopy (2013); hx pacemaker (2013); pr cardiac surg procedure unlist (2010); hx heart catheterization (2010); hx gyn; hx tonsillectomy (1983); and colonoscopy (N/A, 12/18/2017).   Social History/Living Environment:      Prior Level of Function/Work/Activity:  independent  Dominant Side:         RIGHT    Current Medications:    Current Outpatient Prescriptions:    GUAIFENESIN/PSEUDOEPHEDRNE HCL (MUCINEX D PO), Take  by mouth., Disp: , Rfl:     chlorpheniramine-HYDROcodone (TUSSIONEX) 10-8 mg/5 mL suspension, Take 5 mL by mouth every twelve (12) hours as needed for Cough. Max Daily Amount: 10 mL., Disp: 120 mL, Rfl: 0    azithromycin (ZITHROMAX) 250 mg tablet, Take two tablets po today and then one po q day, Disp: 6 Tab, Rfl: 0    dicyclomine (BENTYL) 10 mg capsule, Take 10 mg by mouth as needed. , Disp: , Rfl:     acyclovir (ZOVIRAX) 400 mg tablet, Take 1 Tab by mouth two (2) times a day., Disp: 180 Tab, Rfl: 1    enoxaparin (LOVENOX) 80 mg/0.8 mL injection, 80 mg by SubCUTAneous route every twelve (12) hours. (Patient taking differently: 80 mg by SubCUTAneous route every twelve (12) hours. Lovenox bridge:), Disp: 10 Syringe, Rfl: 1    budesonide-formoterol (SYMBICORT) 160-4.5 mcg/actuation HFA inhaler, Take 2 Puffs by inhalation two (2) times a day., Disp: 3 Inhaler, Rfl: 3    clorazepate (TRANXENE) 7.5 mg tablet, Take 1 Tab by mouth two (2) times a day. Max Daily Amount: 15 mg. (Patient taking differently: Take 7.5 mg by mouth as needed.), Disp: 30 Tab, Rfl: 5    simvastatin (ZOCOR) 40 mg tablet, Take 1 Tab by mouth nightly., Disp: 90 Tab, Rfl: 3    warfarin (COUMADIN) 5 mg tablet, Take 1 Tab by mouth daily. (Patient taking differently: Take 5 mg by mouth nightly.), Disp: 90 Tab, Rfl: 3    montelukast (SINGULAIR) 10 mg tablet, Take 1 Tab by mouth daily. (Patient taking differently: Take 10 mg by mouth nightly.), Disp: 90 Tab, Rfl: 3    fluticasone (FLONASE) 50 mcg/actuation nasal spray, 2 sprays each nostril daily (Patient taking differently: 2 Sprays by Both Nostrils route two (2) times a day. 2 sprays each nostril daily), Disp: 3 Bottle, Rfl: 3    albuterol (PROAIR HFA) 90 mcg/actuation inhaler, Take 2 Puffs by inhalation every four (4) hours as needed for Wheezing.  (Patient taking differently: Take 2 Puffs by inhalation as needed for Wheezing.), Disp: 3 Inhaler, Rfl: 3    co-enzyme Q-10 (CO Q-10) 100 mg capsule, Take 100 mg by mouth daily. , Disp: , Rfl:     multivitamin (ONE A DAY) tablet, Take 1 Tab by mouth daily. , Disp: , Rfl:     acetaminophen (TYLENOL EXTRA STRENGTH) 500 mg tablet, Take  by mouth as needed for Pain., Disp: , Rfl:     B.infantis-B.ani-B.long-B.bifi (PROBIOTIC 4X) 10-15 mg TbEC, Take 1 Tab by mouth daily. , Disp: , Rfl:     aspirin delayed-release 81 mg tablet, Take 162 mg by mouth daily. , Disp: , Rfl:     Omega-3-DHA-EPA-Fish Oil 1,000 (120-180) mg cap, Take  by mouth., Disp: , Rfl:    Date Last Reviewed: 1/10/2018    Number of medical conditions (excluding presenting problem) that affect the Plan of Care: Brief history (0):  LOW COMPLEXITY   ASSESSMENT OF OCCUPATIONAL PERFORMANCE:   RANGE OF MOTION:     · AROM: Left wrist motion is as follows: extension 50, flexion 35, U.D. 30, R.D. 15, supination 80, pronation 84 degrees. STRENGTH:   STRENGTH: Right 58 lbs. Left 52 lbs. LAT PINCH: Right 16 lbs. Left 14 lbs. SENSATION:  Intact           Physical Skills Involved:  1. Range of Motion  2. Strength  3. Pain (Chronic) Cognitive Skills Affected (resulting in the inability to perform in a timely and safe manner): 1. none Psychosocial Skills Affected:  1. none   Number of elements that affect the Plan of Care: 1-3:  LOW COMPLEXITY   CLINICAL DECISION MAKING:   Outcome Measure: Tool Used: Disabilities of the Arm, Shoulder and Hand (DASH) Questionnaire - Quick Version  Score:  Initial: 22/55  Most Recent: X/55 (Date: -- )   Interpretation of Score: The DASH is designed to measure the activities of daily living in person's with upper extremity dysfunction or pain. Each section is scored on a 1-5 scale, 5 representing the greatest disability. The scores of each section are added together for a total score of 55. Score 11 12-19 20-28 29-37 38-45 46-54 55   Modifier CH CI CJ CK CL CM CN     ?  Carrying, Moving, and Handling Objects:     - CURRENT STATUS: CJ - 20%-39% impaired, limited or restricted    - GOAL STATUS: CI - 1%-19% impaired, limited or restricted    - D/C STATUS:  ---------------To be determined---------------      Medical Necessity:   · Patient is expected to demonstrate progress in strength and range of motion to increase independence with ADL,household and work activities. .  Reason for Services/Other Comments:  · Patient has limited motion ,strength and function in her left U.E..  Clinical Decision-Making Assessment:     Clinical Decision-Making: LOW COMPLEXITY   TREATMENT:   (In addition to Assessment/Re-Assessment sessions the following treatments were rendered)  Pre-treatment Symptoms/Complaints:  Pain and stiffness in left wrist.  Pain: Initial: Pain Intensity 1: 2  Pain Location 1: Wrist  Pain Orientation 1: Left    Post Session:  0     Patient was instructed in a home exercise program.  Patient stated \"I have been sick. \". Manual Therapy: (Soft Tissue Mobilization Duration  Duration: 15 Minutes  Duration: 15 Minutes): Technique: Retrograde massage  LUE Soft Tissue Mobilization: Yes  Technique: Retrograde massage (followed by Light tx)   Therapeutic Exercise:                                                                               : The patient's home exercise program was removed.                                                 Date:  12/11/17 Date:  1/4/18 Date:  1/10/18 Date:  11/29/17 Date:  12/6/17   Activity/Exercise Parameters Parameters Parameters Parameters Parameters   AROM during Fluidotherapy 20 min 20 min 20 min 20 min 15 min   Paraffin with Stretch   15 min 15 min 15 min 15 min 15 min   Retrograde massage, Friction Scar massage, Joint Mobilization   15 min  Light tx 15 min  Light tx 15 min  Light tx 15 min  Light tx 15 min  Light tx   Scarf Curl   2 min 2 min 2 min 2 min 2 min   Washer Game 2 min 2 min 2 min 2 min 2 min   Individual Gripper          Hand Winona Cones          Pegs          Clothes Pins          A-R Bar          Exerstick          Velcro-Roll                  RESISTIVE EXERCISES Weight/ Sets/Reps   Weight/ Sets/Reps Weight/ Sets/Reps Weight/ Sets/Reps Weight/ Sets/Reps   WEIGHT WELL        Sup/Pro        UD/RD        Wrist Flex/Ext        Free Weights          UBE(Minutes)          Nautilus        Compound Row        Vertical Chest        Overhead Press                    HEP: As above; handouts given to patient for all exercises. Therapeutic Modalities:         Left Wrist Heat  Type: Paraffin bath  Duration: 15 minutes  Patient Position: Sitting                                     Joint Mobilization:        Treatment Times:  · Therapeutic Exercise: 15 minutes  · Manual Therapy: 15 minutes  · Parafin: 15 minutes  · Whirlpool:  minutes  · Other:  minutes       Treatment/Session Assessment:    · Response to Treatment:  Patients tolerated treatment well with no complications. Upon completion of treatment, skin condition was normal..  · Compliance with Program/Exercises: Will assess as treatment progresses. · Recommendations/Intent for next treatment session: \"Next visit will focus on advancements to more challenging activities\". Will continue per MD.  Total Treatment Duration:  OT Patient Time In/Time Out  Time In: 0730  Time Out: [address] [facility],

## 2018-01-17 ENCOUNTER — APPOINTMENT (OUTPATIENT)
Dept: PHYSICAL THERAPY | Age: 63
End: 2018-01-17
Attending: FAMILY MEDICINE
Payer: COMMERCIAL

## 2018-01-24 ENCOUNTER — HOSPITAL ENCOUNTER (OUTPATIENT)
Dept: PHYSICAL THERAPY | Age: 63
Discharge: HOME | End: 2018-01-24
Attending: FAMILY MEDICINE
Payer: COMMERCIAL

## 2018-01-24 PROCEDURE — 97140 MANUAL THERAPY 1/> REGIONS: CPT

## 2018-01-24 PROCEDURE — 97018 PARAFFIN BATH THERAPY: CPT

## 2018-01-24 PROCEDURE — 97110 THERAPEUTIC EXERCISES: CPT

## 2018-01-24 NOTE — PROGRESS NOTES
Eliezer Helder  : 1955 [facility]  [address], [address] [place] [address],[address] [facility]  [phone]   [phone]         OUTPATIENT OCCUPATIONAL THERAPY: Daily Note 2018    ICD-10: Treatment Diagnosis: Stiffness of left wrist, not elsewhere classified (M25.632)Pain in left wrist (M25.532)  Precautions/Allergies:   Codeine; Darvocet a500 [propoxyphene n-acetaminophen]; Iodinated contrast- oral and iv dye; and Phenergan [promethazine]   Fall Risk Score: 0 (? 5 = High Risk)  MD Orders: Evaluate and treat MEDICAL/REFERRING DIAGNOSIS:   Pain in left wrist [M25.532]   DATE OF ONSET: several months ago   REFERRING PHYSICIAN: Mark Le MD  RETURN PHYSICIAN APPOINTMENT: 6 weeks     INITIAL ASSESSMENT:  Ms. Stephanie Ny presents with decreased functional use, strength and range of motion of her left wrist and upper extremity that is affecting her independence with activities of daily living and ability to perform job tasks. I feel that Ms. Stephanie Ny will benefit from skilled occupational therapy to maximize the functional use of her wrist and upper extremity in daily activities and work tasks. PLAN OF CARE:   PROBLEM LIST:  1. Pain in left wrist.  2. Decreased motion in left wrist.  3. Decreased strength in left hand. INTERVENTIONS PLANNED:  1. Modalities that may include fluidotherapy, paraffin, ultrasound, and light therapy. 2. Therapeutic exercise including a home exercise program.  3. Manual therapy. 4. Therapeutic activities. TREATMENT PLAN:  Effective Dates: 2017 TO 2018. Frequency/Duration: 1 time a week for 10 weeks  GOALS: (Goals have been discussed and agreed upon with patient.)  Short-Term Functional Goals: Time Frame: 4 weeks  1. Decrease pain to 4 to allow patient to perform self care tasks. 2. Increase motion in left wrist by 10 degrees to improve functional use of upper extremity in ADL activities.   3. Increase strength in left hand by 5 pounds to allow patient to  and lift objects during self care activities. Discharge Goals: Time Frame: 10 weeks  1. Decrease pain to 2 to allow patient to perform all household and work tasks. 2. Increase motion in left wrist by 20 degreees to allow patient to perform all ADL activities. 3. Increase strength in left hand by 8 pounds to allow patient to , lift, hold, and carry heavy objects. Rehabilitation Potential For Stated Goals: Good  Regarding Andrae Ferro's therapy, I certify that the treatment plan above will be carried out by a therapist or under their direction. Thank you for this referral,  Jean Santiago, OT       Referring Physician Signature: Juan Cardenas MD _________________________  Date _________            The information in this section was collected on 11/1/2017 (except where otherwise noted). OCCUPATIONAL PROFILE & HISTORY:   History of Present Injury/Illness (Reason for Referral): The patient has developed increasing pain in her left wrist for several months. Past Medical History/Comorbidities:   Ms. Donnie Cantrell  has a past medical history of Allergic rhinitis; Asthma; Atrial fibrillation ([address] Depression; Essential hypertension, benign; Fatty liver; GERD (gastroesophageal reflux disease); Hyperlipidemia; Measles; Mitral stenosis; Osteoarthritis; Other disorder of calcium metabolism; Rheumatic fever; Rosacea; TIA (transient ischemic attack); and Unspecified hypothyroidism. Ms. Donnie Cantrell  has a past surgical history that includes hx refractive surgery (Bilateral); hx colonoscopy (2008); hx colonoscopy (2013); hx pacemaker (2013); pr cardiac surg procedure unlist (2010); hx heart catheterization (2010); hx gyn; hx tonsillectomy (1983); and colonoscopy (N/A, 12/18/2017).   Social History/Living Environment:      Prior Level of Function/Work/Activity:  independent  Dominant Side:         RIGHT    Current Medications:    Current Outpatient Prescriptions:    GUAIFENESIN/PSEUDOEPHEDRNE HCL (MUCINEX D PO), Take  by mouth., Disp: , Rfl:     chlorpheniramine-HYDROcodone (TUSSIONEX) 10-8 mg/5 mL suspension, Take 5 mL by mouth every twelve (12) hours as needed for Cough. Max Daily Amount: 10 mL., Disp: 120 mL, Rfl: 0    dicyclomine (BENTYL) 10 mg capsule, Take 10 mg by mouth as needed. , Disp: , Rfl:     acyclovir (ZOVIRAX) 400 mg tablet, Take 1 Tab by mouth two (2) times a day., Disp: 180 Tab, Rfl: 1    enoxaparin (LOVENOX) 80 mg/0.8 mL injection, 80 mg by SubCUTAneous route every twelve (12) hours. (Patient taking differently: 80 mg by SubCUTAneous route every twelve (12) hours. Lovenox bridge:), Disp: 10 Syringe, Rfl: 1    budesonide-formoterol (SYMBICORT) 160-4.5 mcg/actuation HFA inhaler, Take 2 Puffs by inhalation two (2) times a day., Disp: 3 Inhaler, Rfl: 3    clorazepate (TRANXENE) 7.5 mg tablet, Take 1 Tab by mouth two (2) times a day. Max Daily Amount: 15 mg. (Patient taking differently: Take 7.5 mg by mouth as needed.), Disp: 30 Tab, Rfl: 5    simvastatin (ZOCOR) 40 mg tablet, Take 1 Tab by mouth nightly., Disp: 90 Tab, Rfl: 3    warfarin (COUMADIN) 5 mg tablet, Take 1 Tab by mouth daily. (Patient taking differently: Take 5 mg by mouth nightly.), Disp: 90 Tab, Rfl: 3    montelukast (SINGULAIR) 10 mg tablet, Take 1 Tab by mouth daily. (Patient taking differently: Take 10 mg by mouth nightly.), Disp: 90 Tab, Rfl: 3    fluticasone (FLONASE) 50 mcg/actuation nasal spray, 2 sprays each nostril daily (Patient taking differently: 2 Sprays by Both Nostrils route two (2) times a day. 2 sprays each nostril daily), Disp: 3 Bottle, Rfl: 3    albuterol (PROAIR HFA) 90 mcg/actuation inhaler, Take 2 Puffs by inhalation every four (4) hours as needed for Wheezing. (Patient taking differently: Take 2 Puffs by inhalation as needed for Wheezing.), Disp: 3 Inhaler, Rfl: 3    co-enzyme Q-10 (CO Q-10) 100 mg capsule, Take 100 mg by mouth daily. , Disp: , Rfl:    multivitamin (ONE A DAY) tablet, Take 1 Tab by mouth daily. , Disp: , Rfl:     acetaminophen (TYLENOL EXTRA STRENGTH) 500 mg tablet, Take  by mouth as needed for Pain., Disp: , Rfl:     B.infantis-B.ani-B.long-B.bifi (PROBIOTIC 4X) 10-15 mg TbEC, Take 1 Tab by mouth daily. , Disp: , Rfl:     aspirin delayed-release 81 mg tablet, Take 162 mg by mouth daily. , Disp: , Rfl:     Omega-3-DHA-EPA-Fish Oil 1,000 (120-180) mg cap, Take  by mouth., Disp: , Rfl:    Date Last Reviewed: 1/24/2018    Number of medical conditions (excluding presenting problem) that affect the Plan of Care: Brief history (0):  LOW COMPLEXITY   ASSESSMENT OF OCCUPATIONAL PERFORMANCE:   RANGE OF MOTION:     · AROM: Left wrist motion is as follows: extension 50, flexion 35, U.D. 30, R.D. 15, supination 80, pronation 84 degrees. STRENGTH:   STRENGTH: Right 58 lbs. Left 52 lbs. LAT PINCH: Right 16 lbs. Left 14 lbs. SENSATION:  Intact           Physical Skills Involved:  1. Range of Motion  2. Strength  3. Pain (Chronic) Cognitive Skills Affected (resulting in the inability to perform in a timely and safe manner): 1. none Psychosocial Skills Affected:  1. none   Number of elements that affect the Plan of Care: 1-3:  LOW COMPLEXITY   CLINICAL DECISION MAKING:   Outcome Measure: Tool Used: Disabilities of the Arm, Shoulder and Hand (DASH) Questionnaire - Quick Version  Score:  Initial: 22/55  Most Recent: X/55 (Date: -- )   Interpretation of Score: The DASH is designed to measure the activities of daily living in person's with upper extremity dysfunction or pain. Each section is scored on a 1-5 scale, 5 representing the greatest disability. The scores of each section are added together for a total score of 55. Score 11 12-19 20-28 29-37 38-45 46-54 55   Modifier CH CI CJ CK CL CM CN     ?  Carrying, Moving, and Handling Objects:     - CURRENT STATUS: CJ - 20%-39% impaired, limited or restricted    - GOAL STATUS: CI - 1%-19% impaired, limited or restricted    - D/C STATUS:  ---------------To be determined---------------      Medical Necessity:   · Patient is expected to demonstrate progress in strength and range of motion to increase independence with ADL,household and work activities. .  Reason for Services/Other Comments:  · Patient has limited motion ,strength and function in her left U.E..  Clinical Decision-Making Assessment:     Clinical Decision-Making: LOW COMPLEXITY   TREATMENT:   (In addition to Assessment/Re-Assessment sessions the following treatments were rendered)  Pre-treatment Symptoms/Complaints:  Pain and stiffness in left wrist.  Pain: Initial: Pain Intensity 1: 2  Pain Location 1: Wrist  Pain Orientation 1: Left    Post Session:  0     Patient was instructed in a home exercise program.  Patient stated \"My pain is slowly decreasing. \". Manual Therapy: (Soft Tissue Mobilization Duration  Duration: 15 Minutes  Duration: 15 Minutes): Technique: Retrograde massage  LUE Soft Tissue Mobilization: Yes  Technique: Retrograde massage (followed by Light tx)   Therapeutic Exercise:                                                                               : The patient's home exercise program was removed.                                                 Date:  12/11/17 Date:  1/4/18 Date:  1/10/18 Date:  1/23/18 Date:  12/6/17   Activity/Exercise Parameters Parameters Parameters Parameters Parameters   AROM during Fluidotherapy 20 min 20 min 20 min 20 min 15 min   Paraffin with Stretch   15 min 15 min 15 min 15 min 15 min   Retrograde massage, Friction Scar massage, Joint Mobilization   15 min  Light tx 15 min  Light tx 15 min  Light tx 15 min  Light tx 15 min  Light tx   Scarf Curl   2 min 2 min 2 min 2 min 2 min   Washer Game 2 min 2 min 2 min 2 min 2 min   Individual Gripper          Hand Holmen          Cones          Pegs          Clothes Pins          A-R Bar          Exerstick Velcro-Roll                  RESISTIVE EXERCISES Weight/ Sets/Reps   Weight/ Sets/Reps Weight/ Sets/Reps Weight/ Sets/Reps Weight/ Sets/Reps   WEIGHT WELL        Sup/Pro        UD/RD        Wrist Flex/Ext        Free Weights          UBE(Minutes)          Nautilus        Compound Row        Vertical Chest        Overhead Press                    HEP: As above; handouts given to patient for all exercises. Therapeutic Modalities:         Left Wrist Heat  Type: Paraffin bath  Duration: 15 minutes  Patient Position: Sitting                                     Joint Mobilization:        Treatment Times:  · Therapeutic Exercise: 15 minutes  · Manual Therapy: 15 minutes  · Parafin: 15 minutes  · Whirlpool:  minutes  · Other:  minutes       Treatment/Session Assessment:    · Response to Treatment:  Patients tolerated treatment well with no complications. Upon completion of treatment, skin condition was normal..  · Compliance with Program/Exercises: Will assess as treatment progresses. · Recommendations/Intent for next treatment session: \"Next visit will focus on advancements to more challenging activities\". Will continue per MD.  Total Treatment Duration:  OT Patient Time In/Time Out  Time In: 0730  Time Out: [address] [facility],

## 2018-01-31 ENCOUNTER — HOSPITAL ENCOUNTER (OUTPATIENT)
Dept: PHYSICAL THERAPY | Age: 63
Discharge: HOME | End: 2018-01-31
Attending: FAMILY MEDICINE
Payer: COMMERCIAL

## 2018-01-31 PROCEDURE — 97110 THERAPEUTIC EXERCISES: CPT

## 2018-01-31 PROCEDURE — 97018 PARAFFIN BATH THERAPY: CPT

## 2018-01-31 PROCEDURE — 97140 MANUAL THERAPY 1/> REGIONS: CPT

## 2018-01-31 NOTE — THERAPY DISCHARGE
Misa Spencer  : 1955 [facility] [place] [address], [place] [address].  [phone]   [phone]         OUTPATIENT OCCUPATIONAL THERAPY: Daily Note and Discharge 2018    ICD-10: Treatment Diagnosis: Stiffness of left wrist, not elsewhere classified (M25.632)Pain in left wrist (M25.532)  Precautions/Allergies:   Codeine; Darvocet a500 [propoxyphene n-acetaminophen]; Iodinated contrast- oral and iv dye; and Phenergan [promethazine]   Fall Risk Score: 0 (? 5 = High Risk)  MD Orders: Evaluate and treat MEDICAL/REFERRING DIAGNOSIS:   Pain in left wrist [M25.532]   DATE OF ONSET: several months ago   REFERRING PHYSICIAN: Paris Rich MD  RETURN PHYSICIAN APPOINTMENT: 6 weeks     INITIAL ASSESSMENT:  Ms. Meño Armenta presents with decreased functional use, strength and range of motion of her left wrist and upper extremity that is affecting her independence with activities of daily living and ability to perform job tasks. I feel that Ms. Meño Armenta will benefit from skilled occupational therapy to maximize the functional use of her wrist and upper extremity in daily activities and work tasks. PLAN OF CARE:   PROBLEM LIST:  1. Pain in left wrist.  2. Decreased motion in left wrist.  3. Decreased strength in left hand. INTERVENTIONS PLANNED:  1. Modalities that may include fluidotherapy, paraffin, ultrasound, and light therapy. 2. Therapeutic exercise including a home exercise program.  3. Manual therapy. 4. Therapeutic activities. TREATMENT PLAN:  Effective Dates: 2017 TO 2018. Frequency/Duration: 1 time a week for 10 weeks  GOALS: (Goals have been discussed and agreed upon with patient.)  Short-Term Functional Goals: Time Frame: 4 weeks  1. Decrease pain to 4 to allow patient to perform self care tasks. ( GOAL MET MOST OF THE TIME )  2.  Increase motion in left wrist by 10 degrees to improve functional use of upper extremity in ADL activities. ( GOAL MET )  3. Increase strength in left hand by 5 pounds to allow patient to  and lift objects during self care activities. ( GOAL NOT MET )  Discharge Goals: Time Frame: 10 weeks  1. Decrease pain to 2 to allow patient to perform all household and work tasks. ( GOAL MET AT TIMES )  2. Increase motion in left wrist by 20 degreees to allow patient to perform all ADL activities. ( GOAL MET )  3. Increase strength in left hand by 8 pounds to allow patient to , lift, hold, and carry heavy objects. ( GOAL NOT MET )  Rehabilitation Potential For Stated Goals: Good  Regarding Prem Ferro's therapy, I certify that the treatment plan above will be carried out by a therapist or under their direction. Thank you for this referral,  Suly Blanton OT       Referring Physician Signature: Niels Wheeler MD _________________________  Date _________            The information in this section was collected on 11/1/2017 (except where otherwise noted). OCCUPATIONAL PROFILE & HISTORY:   History of Present Injury/Illness (Reason for Referral): The patient has developed increasing pain in her left wrist for several months. Past Medical History/Comorbidities:   Ms. Pina Roth  has a past medical history of Allergic rhinitis; Asthma; Atrial fibrillation ([address] Depression; Essential hypertension, benign; Fatty liver; GERD (gastroesophageal reflux disease); Hyperlipidemia; Measles; Mitral stenosis; Osteoarthritis; Other disorder of calcium metabolism; Rheumatic fever; Rosacea; TIA (transient ischemic attack); and Unspecified hypothyroidism. Ms. Pina Roth  has a past surgical history that includes hx refractive surgery (Bilateral); hx colonoscopy (2008); hx colonoscopy (2013); hx pacemaker (2013); pr cardiac surg procedure unlist (2010); hx heart catheterization (2010); hx gyn; hx tonsillectomy (1983); and colonoscopy (N/A, 12/18/2017).   Social History/Living Environment:      Prior Level of Function/Work/Activity:  independent  Dominant Side:         RIGHT    Current Medications:    Current Outpatient Prescriptions:     GUAIFENESIN/PSEUDOEPHEDRNE HCL (MUCINEX D PO), Take  by mouth., Disp: , Rfl:     chlorpheniramine-HYDROcodone (TUSSIONEX) 10-8 mg/5 mL suspension, Take 5 mL by mouth every twelve (12) hours as needed for Cough. Max Daily Amount: 10 mL., Disp: 120 mL, Rfl: 0    dicyclomine (BENTYL) 10 mg capsule, Take 10 mg by mouth as needed. , Disp: , Rfl:     acyclovir (ZOVIRAX) 400 mg tablet, Take 1 Tab by mouth two (2) times a day., Disp: 180 Tab, Rfl: 1    enoxaparin (LOVENOX) 80 mg/0.8 mL injection, 80 mg by SubCUTAneous route every twelve (12) hours. (Patient taking differently: 80 mg by SubCUTAneous route every twelve (12) hours. Lovenox bridge:), Disp: 10 Syringe, Rfl: 1    budesonide-formoterol (SYMBICORT) 160-4.5 mcg/actuation HFA inhaler, Take 2 Puffs by inhalation two (2) times a day., Disp: 3 Inhaler, Rfl: 3    clorazepate (TRANXENE) 7.5 mg tablet, Take 1 Tab by mouth two (2) times a day. Max Daily Amount: 15 mg. (Patient taking differently: Take 7.5 mg by mouth as needed.), Disp: 30 Tab, Rfl: 5    simvastatin (ZOCOR) 40 mg tablet, Take 1 Tab by mouth nightly., Disp: 90 Tab, Rfl: 3    warfarin (COUMADIN) 5 mg tablet, Take 1 Tab by mouth daily. (Patient taking differently: Take 5 mg by mouth nightly.), Disp: 90 Tab, Rfl: 3    montelukast (SINGULAIR) 10 mg tablet, Take 1 Tab by mouth daily. (Patient taking differently: Take 10 mg by mouth nightly.), Disp: 90 Tab, Rfl: 3    fluticasone (FLONASE) 50 mcg/actuation nasal spray, 2 sprays each nostril daily (Patient taking differently: 2 Sprays by Both Nostrils route two (2) times a day. 2 sprays each nostril daily), Disp: 3 Bottle, Rfl: 3    albuterol (PROAIR HFA) 90 mcg/actuation inhaler, Take 2 Puffs by inhalation every four (4) hours as needed for Wheezing.  (Patient taking differently: Take 2 Puffs by inhalation as needed for Wheezing.), Disp: 3 Inhaler, Rfl: 3    co-enzyme Q-10 (CO Q-10) 100 mg capsule, Take 100 mg by mouth daily. , Disp: , Rfl:     multivitamin (ONE A DAY) tablet, Take 1 Tab by mouth daily. , Disp: , Rfl:     acetaminophen (TYLENOL EXTRA STRENGTH) 500 mg tablet, Take  by mouth as needed for Pain., Disp: , Rfl:     B.infantis-B.ani-B.long-B.bifi (PROBIOTIC 4X) 10-15 mg TbEC, Take 1 Tab by mouth daily. , Disp: , Rfl:     aspirin delayed-release 81 mg tablet, Take 162 mg by mouth daily. , Disp: , Rfl:     Omega-3-DHA-EPA-Fish Oil 1,000 (120-180) mg cap, Take  by mouth., Disp: , Rfl:    Date Last Reviewed: 1/31/2018    Number of medical conditions (excluding presenting problem) that affect the Plan of Care: Brief history (0):  LOW COMPLEXITY   ASSESSMENT OF OCCUPATIONAL PERFORMANCE:   RANGE OF MOTION:     · AROM: Left wrist motion is as follows: extension 55, flexion 65, U.D. 30, R.D. 15, supination 90, pronation 90 degrees. STRENGTH:   STRENGTH: Right 55 lbs. Left 45 lbs. LAT PINCH: Right 16 lbs. Left 14 lbs. SENSATION:  Intact           Physical Skills Involved:  1. Range of Motion  2. Strength  3. Pain (Chronic) Cognitive Skills Affected (resulting in the inability to perform in a timely and safe manner): 1. none Psychosocial Skills Affected:  1. none   Number of elements that affect the Plan of Care: 1-3:  LOW COMPLEXITY   CLINICAL DECISION MAKING:   Outcome Measure: Tool Used: Disabilities of the Arm, Shoulder and Hand (DASH) Questionnaire - Quick Version  Score:  Initial: 22/55  Most Recent: 21/55 (Date: 1/31/2018)   Interpretation of Score: The DASH is designed to measure the activities of daily living in person's with upper extremity dysfunction or pain. Each section is scored on a 1-5 scale, 5 representing the greatest disability. The scores of each section are added together for a total score of 55.     Score 11 12-19 20-28 29-37 38-45 46-54 55   Modifier CH CI CJ CK CL CM CN     ? Carrying, Moving, and Handling Objects:     - CURRENT STATUS: CJ - 20%-39% impaired, limited or restricted    - GOAL STATUS: CI - 1%-19% impaired, limited or restricted    - D/C STATUS:  CJ - 20%-39% impaired, limited or restricted      Medical Necessity:   · Patient is expected to demonstrate progress in strength and range of motion to increase independence with ADL,household and work activities. .  Reason for Services/Other Comments:  · Patient has limited motion ,strength and function in her left U.E..  Clinical Decision-Making Assessment:     Clinical Decision-Making: LOW COMPLEXITY   TREATMENT:   (In addition to Assessment/Re-Assessment sessions the following treatments were rendered)  Pre-treatment Symptoms/Complaints:  Pain and stiffness in left wrist.  Pain: Initial: Pain Intensity 1: 2  Pain Location 1: Wrist  Pain Orientation 1: Left    Post Session:  2     Patient was instructed in a home exercise program.  Patient stated \"This has been a painful week because it has been so cold. \".    Manual Therapy: (Soft Tissue Mobilization Duration  Duration: 15 Minutes  Duration: 15 Minutes): Technique: Retrograde massage  LUE Soft Tissue Mobilization: Yes  Technique: Retrograde massage (followed by Light tx)   Therapeutic Exercise:                                                                               : The patient's home exercise program was removed.                                                 Date:  12/11/17 Date:  1/4/18 Date:  1/10/18 Date:  1/23/18 Date:  1/31/18   Activity/Exercise Parameters Parameters Parameters Parameters Parameters   AROM during Fluidotherapy 20 min 20 min 20 min 20 min 15 min   Paraffin with Stretch   15 min 15 min 15 min 15 min 15 min   Retrograde massage, Friction Scar massage, Joint Mobilization   15 min  Light tx 15 min  Light tx 15 min  Light tx 15 min  Light tx 15 min  Light tx   Scarf Curl   2 min 2 min 2 min 2 min 2 min   Washer Game 2 min 2 min 2 min 2 min 2 min   Individual Gripper          Hand [place]          Cones          Pegs          Clothes Pins          A-R Bar          Exerstick          Velcro-Roll                  RESISTIVE EXERCISES Weight/ Sets/Reps   Weight/ Sets/Reps Weight/ Sets/Reps Weight/ Sets/Reps Weight/ Sets/Reps   WEIGHT WELL        Sup/Pro        UD/RD        Wrist Flex/Ext        Free Weights          UBE(Minutes)          Nautilus        Compound Row        Vertical Chest        Overhead Press                    HEP: As above; handouts given to patient for all exercises. Therapeutic Modalities:         Left Wrist Heat  Type: Paraffin bath  Duration: 15 minutes  Patient Position: Sitting                                     Joint Mobilization:        Treatment Times:  · Therapeutic Exercise: 15 minutes  · Manual Therapy: 15 minutes  · Parafin: 15 minutes  · Whirlpool:  minutes  · Other:  minutes       Treatment/Session Assessment:    · Response to Treatment:  Patients tolerated treatment well with no complications. Upon completion of treatment, skin condition was normal..  · Compliance with Program/Exercises: Will assess as treatment progresses. · Recommendations/Intent for next treatment session: \"To discharge at this time. \"  Total Treatment Duration:  OT Patient Time In/Time Out  Time In: 0730  Time Out: [facility],

## 2018-07-23 ENCOUNTER — HOSPITAL ENCOUNTER (OUTPATIENT)
Dept: MAMMOGRAPHY | Age: 63
Discharge: HOME | End: 2018-07-23
Attending: OBSTETRICS & GYNECOLOGY
Payer: COMMERCIAL

## 2018-07-23 DIAGNOSIS — Z12.31 VISIT FOR SCREENING MAMMOGRAM: ICD-10-CM

## 2018-07-23 PROCEDURE — 77067 SCR MAMMO BI INCL CAD: CPT

## 2018-12-20 ENCOUNTER — HOSPITAL ENCOUNTER (OUTPATIENT)
Dept: CT IMAGING | Age: 63
Discharge: HOME | End: 2018-12-20
Attending: INTERNAL MEDICINE
Payer: COMMERCIAL

## 2018-12-20 DIAGNOSIS — R93.3 ABNORMAL CT SCAN, COLON: ICD-10-CM

## 2018-12-20 DIAGNOSIS — Z79.01 LONG TERM (CURRENT) USE OF ANTICOAGULANTS: ICD-10-CM

## 2018-12-20 PROCEDURE — 74261 CT COLONOGRAPHY DX: CPT

## 2019-07-24 ENCOUNTER — HOSPITAL ENCOUNTER (OUTPATIENT)
Dept: MAMMOGRAPHY | Age: 64
Discharge: HOME | End: 2019-07-24
Attending: OBSTETRICS & GYNECOLOGY
Payer: MEDICARE

## 2019-07-24 DIAGNOSIS — Z12.31 VISIT FOR SCREENING MAMMOGRAM: ICD-10-CM

## 2019-07-24 PROCEDURE — 77067 SCR MAMMO BI INCL CAD: CPT

## 2019-07-30 ENCOUNTER — HOSPITAL ENCOUNTER (OUTPATIENT)
Dept: MAMMOGRAPHY | Age: 64
Discharge: HOME | End: 2019-07-30
Attending: OBSTETRICS & GYNECOLOGY
Payer: MEDICARE

## 2019-07-30 DIAGNOSIS — R92.8 ABNORMAL SCREENING MAMMOGRAM: ICD-10-CM

## 2019-07-30 PROCEDURE — 77065 DX MAMMO INCL CAD UNI: CPT

## 2020-11-23 ENCOUNTER — TRANSCRIBE ORDER (OUTPATIENT)
Dept: SCHEDULING | Age: 65
End: 2020-11-23

## 2020-11-23 DIAGNOSIS — R26.89 BALANCE PROBLEM: Primary | ICD-10-CM

## 2020-12-04 ENCOUNTER — HOSPITAL ENCOUNTER (OUTPATIENT)
Dept: MRI IMAGING | Age: 65
Discharge: HOME | End: 2020-12-04
Attending: FAMILY MEDICINE
Payer: MEDICARE

## 2020-12-04 DIAGNOSIS — R26.89 BALANCE PROBLEM: ICD-10-CM

## 2020-12-04 PROCEDURE — 70551 MRI BRAIN STEM W/O DYE: CPT

## 2021-03-18 ENCOUNTER — TRANSCRIBE ORDER (OUTPATIENT)
Dept: SCHEDULING | Age: 66
End: 2021-03-18

## 2021-03-18 DIAGNOSIS — Z12.31 VISIT FOR SCREENING MAMMOGRAM: Primary | ICD-10-CM

## 2021-05-17 ENCOUNTER — HOSPITAL ENCOUNTER (OUTPATIENT)
Dept: MAMMOGRAPHY | Age: 66
Discharge: HOME | End: 2021-05-17
Attending: OBSTETRICS & GYNECOLOGY
Payer: MEDICARE

## 2021-05-17 DIAGNOSIS — Z12.31 VISIT FOR SCREENING MAMMOGRAM: ICD-10-CM

## 2021-05-17 PROCEDURE — 77067 SCR MAMMO BI INCL CAD: CPT

## 2022-03-14 PROBLEM — M79.641 RIGHT HAND PAIN: Status: ACTIVE | Noted: 2022-03-14

## 2022-03-19 PROBLEM — M79.641 RIGHT HAND PAIN: Status: ACTIVE | Noted: 2022-03-14

## 2022-03-20 PROBLEM — F33.9 RECURRENT DEPRESSION (HCC): Status: ACTIVE | Noted: 2018-01-06

## 2022-04-11 PROBLEM — S62.366B: Status: ACTIVE | Noted: 2022-04-11

## 2022-04-13 ENCOUNTER — TRANSCRIBE ORDER (OUTPATIENT)
Dept: SCHEDULING | Age: 67
End: 2022-04-13

## 2022-04-13 DIAGNOSIS — S62.367A CLOSED NONDISPLACED FRACTURE OF NECK OF FIFTH METACARPAL BONE OF LEFT HAND, INITIAL ENCOUNTER: Primary | ICD-10-CM

## 2022-06-09 ENCOUNTER — HOSPITAL ENCOUNTER (OUTPATIENT)
Dept: MAMMOGRAPHY | Age: 67
Discharge: HOME | End: 2022-06-12
Payer: MEDICARE

## 2022-06-09 DIAGNOSIS — M85.80 OSTEOPENIA, UNSPECIFIED LOCATION: ICD-10-CM

## 2022-06-09 PROCEDURE — 77080 DXA BONE DENSITY AXIAL: CPT

## 2022-08-19 PROCEDURE — 93294 REM INTERROG EVL PM/LDLS PM: CPT | Performed by: INTERNAL MEDICINE

## 2022-08-19 PROCEDURE — 93296 REM INTERROG EVL PM/IDS: CPT | Performed by: INTERNAL MEDICINE

## 2022-10-10 DIAGNOSIS — Z95.0 PACEMAKER: Primary | ICD-10-CM

## 2022-10-10 DIAGNOSIS — Z95.0 PACEMAKER: ICD-10-CM

## 2022-10-10 DIAGNOSIS — I48.21 PERMANENT ATRIAL FIBRILLATION (HCC): ICD-10-CM

## 2022-10-10 DIAGNOSIS — R00.1 BRADYCARDIA: ICD-10-CM

## 2023-01-09 DIAGNOSIS — Z95.0 PACEMAKER: ICD-10-CM

## 2023-01-09 DIAGNOSIS — R00.1 BRADYCARDIA: ICD-10-CM

## 2023-01-09 DIAGNOSIS — I48.21 PERMANENT ATRIAL FIBRILLATION (HCC): ICD-10-CM

## 2023-04-20 ENCOUNTER — HOSPITAL ENCOUNTER (OUTPATIENT)
Dept: MAMMOGRAPHY | Age: 68
Discharge: HOME | End: 2023-04-20
Payer: MEDICARE

## 2023-04-20 DIAGNOSIS — Z12.39 BREAST SCREENING: ICD-10-CM

## 2023-04-20 PROCEDURE — 77067 SCR MAMMO BI INCL CAD: CPT

## 2023-05-18 NOTE — PROGRESS NOTES
examination reveals - normal heart sounds, regular rate and rhythm with no murmurs. Abdomen   Inspection: - Inspection Normal.   Palpation/Percussion: Palpation and Percussion of the abdomen reveal - Non Tender, No Rebound tenderness, No Rigidity (guarding), No hepatosplenomegaly, No Palpable abdominal masses and Soft. Auscultation: Auscultation of the abdomen reveals - Bowel sounds normal.     Female Genitourinary     External Genitalia   Vulva: - Normal. Perineum - Normal. Bartholin's Gland - Bilateral - Normal. Clitoris - Normal.   Introitus: Characteristics - Normal.   Urethra: Characteristics - Normal.     Speculum & Bimanual   Vagina: Vaginal Mucosa - Normal.   Vaginal Wall: - Normal.   Vaginal Lesions - None. Cervix: Characteristics - Normal.   Uterus: Characteristics - Normal.   Adnexa: - Normal.   Bladder - Normal.     Peripheral Vascular   Normal    Neuropsychiatric   Examination of related systems reveals - The patient is well-nourished and well-groomed. Mental status exam performed with findings of - Oriented X3 with appropriate mood and affect. Musculoskeletal  Normal      General Lymphatics  Normal           Medical problems and test results were reviewed with the patient today. ASSESSMENT and PLAN    1. Encounter for well woman exam with routine gynecological exam  2. Cervical cancer screening  -     PAP LB, Reflex HPV ASCUS ([id number])           No follow-ups on file.        Courtney Escamilla MD  5/19/2023

## 2023-05-19 ENCOUNTER — OFFICE VISIT (OUTPATIENT)
Dept: GYNECOLOGY | Age: 68
End: 2023-05-19

## 2023-05-19 VITALS
SYSTOLIC BLOOD PRESSURE: 126 MMHG | HEIGHT: 62 IN | DIASTOLIC BLOOD PRESSURE: 82 MMHG | BODY MASS INDEX: 31.65 KG/M2 | WEIGHT: 172 LBS

## 2023-05-19 DIAGNOSIS — Z12.4 CERVICAL CANCER SCREENING: ICD-10-CM

## 2023-05-19 DIAGNOSIS — Z01.419 ENCOUNTER FOR WELL WOMAN EXAM WITH ROUTINE GYNECOLOGICAL EXAM: Primary | ICD-10-CM

## 2023-05-19 RX ORDER — POTASSIUM CHLORIDE 20 MEQ/1
TABLET, EXTENDED RELEASE ORAL
COMMUNITY
Start: 2023-04-14

## 2023-05-25 ENCOUNTER — OFFICE VISIT (OUTPATIENT)
Age: 68
End: 2023-05-25

## 2023-05-25 VITALS
WEIGHT: 174 LBS | HEIGHT: 62 IN | HEART RATE: 68 BPM | SYSTOLIC BLOOD PRESSURE: 134 MMHG | BODY MASS INDEX: 32.02 KG/M2 | DIASTOLIC BLOOD PRESSURE: 78 MMHG

## 2023-05-25 DIAGNOSIS — Z95.2 H/O MITRAL VALVE REPLACEMENT WITH MECHANICAL VALVE: Primary | ICD-10-CM

## 2023-05-25 DIAGNOSIS — I48.21 PERMANENT ATRIAL FIBRILLATION (HCC): ICD-10-CM

## 2023-05-25 DIAGNOSIS — E78.00 PURE HYPERCHOLESTEROLEMIA: ICD-10-CM

## 2023-05-25 DIAGNOSIS — Z95.0 PACEMAKER: ICD-10-CM

## 2023-05-25 DIAGNOSIS — I10 ESSENTIAL HYPERTENSION, BENIGN: ICD-10-CM

## 2023-05-25 LAB
CYTOLOGIST CVX/VAG CYTO: NORMAL
CYTOLOGY CVX/VAG DOC THIN PREP: NORMAL
HPV REFLEX: NORMAL
Lab: NORMAL
PATH REPORT.FINAL DX SPEC: NORMAL
STAT OF ADQ CVX/VAG CYTO-IMP: NORMAL

## 2023-05-25 ASSESSMENT — ENCOUNTER SYMPTOMS
COUGH: 0
ABDOMINAL PAIN: 0
BACK PAIN: 0
SHORTNESS OF BREATH: 0

## 2023-05-25 NOTE — PROGRESS NOTES
[facility] CARDIOLOGY  [address], [address]      23      NAME:  Lamin Claudio  : 1955  MRN: [id number]      SUBJECTIVE:   Lamin Claudio is a 77 y.o. female seen for a follow up visit regarding the following:     Chief Complaint   Patient presents with    Irregular Heart Beat    Hypertension    Hyperlipidemia       HPI:   77 y.o. female with history of mechanical aortic valve and chronic atrial fibrillation. She has single-chamber pacemaker. She has been ventricular pacing greater than 50% of the time. She has been missing more tachycardia with exertion. Otherwise no chest pain or shortness of breath. Past Medical History, Past Surgical History, Family history, Social History, and Medications were all reviewed with the patient today and updated as necessary.      Current Outpatient Medications   Medication Sig Dispense Refill    potassium chloride (KLOR-CON M) 20 MEQ extended release tablet       acetaminophen (TYLENOL) 500 MG tablet Take by mouth as needed      albuterol sulfate (PROAIR RESPICLICK) 101 (90 Base) MCG/ACT aerosol powder inhalation Inhale 0.63 mg into the lungs every 6 hours as needed      aspirin 81 MG EC tablet Take 2 tablets by mouth daily      budesonide-formoterol (SYMBICORT) 160-4.5 MCG/ACT AERO Inhale 2 puffs into the lungs 2 times daily      Cetirizine HCl 10 MG CAPS Take by mouth      coenzyme Q10 100 MG CAPS capsule Take 1 capsule by mouth daily      hydroCHLOROthiazide (HYDRODIURIL) 25 MG tablet Take 1 tablet by mouth daily      losartan (COZAAR) 50 MG tablet Take by mouth daily      montelukast (SINGULAIR) 10 MG tablet Take 1 tablet by mouth daily      simethicone (MYLICON) 80 MG chewable tablet Take 1 tablet by mouth every 6 hours as needed      simvastatin (ZOCOR) 40 MG tablet Take 1 tablet by mouth      warfarin (COUMADIN) 5 MG tablet Take 1 tablet by mouth daily       No current facility-administered medications for this

## 2023-06-08 ENCOUNTER — NURSE ONLY (OUTPATIENT)
Age: 68
End: 2023-06-08

## 2023-06-08 DIAGNOSIS — I48.21 PERMANENT ATRIAL FIBRILLATION (HCC): Primary | ICD-10-CM

## 2023-07-06 PROCEDURE — 93296 REM INTERROG EVL PM/IDS: CPT | Performed by: INTERNAL MEDICINE

## 2023-07-06 PROCEDURE — 93294 REM INTERROG EVL PM/LDLS PM: CPT | Performed by: INTERNAL MEDICINE

## 2023-08-29 DIAGNOSIS — Z95.0 PACEMAKER: Primary | ICD-10-CM

## 2023-08-29 DIAGNOSIS — I48.21 PERMANENT ATRIAL FIBRILLATION (HCC): ICD-10-CM

## 2023-10-09 PROCEDURE — 93296 REM INTERROG EVL PM/IDS: CPT | Performed by: INTERNAL MEDICINE

## 2023-10-09 PROCEDURE — 93294 REM INTERROG EVL PM/LDLS PM: CPT | Performed by: INTERNAL MEDICINE

## 2023-11-22 ENCOUNTER — OFFICE VISIT (OUTPATIENT)
Age: 68
End: 2023-11-22
Payer: MEDICARE

## 2023-11-22 VITALS
DIASTOLIC BLOOD PRESSURE: 78 MMHG | SYSTOLIC BLOOD PRESSURE: 139 MMHG | HEART RATE: 74 BPM | WEIGHT: 171 LBS | BODY MASS INDEX: 31.47 KG/M2 | HEIGHT: 62 IN

## 2023-11-22 DIAGNOSIS — Z95.2 H/O MITRAL VALVE REPLACEMENT WITH MECHANICAL VALVE: ICD-10-CM

## 2023-11-22 DIAGNOSIS — I10 ESSENTIAL HYPERTENSION, BENIGN: ICD-10-CM

## 2023-11-22 DIAGNOSIS — Z95.0 PACEMAKER: ICD-10-CM

## 2023-11-22 DIAGNOSIS — E78.00 PURE HYPERCHOLESTEROLEMIA: ICD-10-CM

## 2023-11-22 DIAGNOSIS — I48.21 PERMANENT ATRIAL FIBRILLATION (HCC): Primary | ICD-10-CM

## 2023-11-22 PROCEDURE — 3075F SYST BP GE 130 - 139MM HG: CPT | Performed by: INTERNAL MEDICINE

## 2023-11-22 PROCEDURE — 3017F COLORECTAL CA SCREEN DOC REV: CPT | Performed by: INTERNAL MEDICINE

## 2023-11-22 PROCEDURE — 1123F ACP DISCUSS/DSCN MKR DOCD: CPT | Performed by: INTERNAL MEDICINE

## 2023-11-22 PROCEDURE — 93000 ELECTROCARDIOGRAM COMPLETE: CPT | Performed by: INTERNAL MEDICINE

## 2023-11-22 PROCEDURE — G8484 FLU IMMUNIZE NO ADMIN: HCPCS | Performed by: INTERNAL MEDICINE

## 2023-11-22 PROCEDURE — 1090F PRES/ABSN URINE INCON ASSESS: CPT | Performed by: INTERNAL MEDICINE

## 2023-11-22 PROCEDURE — 99214 OFFICE O/P EST MOD 30 MIN: CPT | Performed by: INTERNAL MEDICINE

## 2023-11-22 PROCEDURE — G8427 DOCREV CUR MEDS BY ELIG CLIN: HCPCS | Performed by: INTERNAL MEDICINE

## 2023-11-22 PROCEDURE — G8399 PT W/DXA RESULTS DOCUMENT: HCPCS | Performed by: INTERNAL MEDICINE

## 2023-11-22 PROCEDURE — 1036F TOBACCO NON-USER: CPT | Performed by: INTERNAL MEDICINE

## 2023-11-22 PROCEDURE — 3078F DIAST BP <80 MM HG: CPT | Performed by: INTERNAL MEDICINE

## 2023-11-22 PROCEDURE — G8417 CALC BMI ABV UP PARAM F/U: HCPCS | Performed by: INTERNAL MEDICINE

## 2023-11-22 ASSESSMENT — ENCOUNTER SYMPTOMS
ABDOMINAL PAIN: 0
BACK PAIN: 0
COUGH: 0
SHORTNESS OF BREATH: 0

## 2023-11-22 NOTE — PROGRESS NOTES
[facility] CARDIOLOGY  [address] [address]      23      NAME:  Guzman Austin  : 1955  MRN: [id number]      SUBJECTIVE:   Guzman Austin is a 77 y.o. female seen for a follow up visit regarding the following:     Chief Complaint   Patient presents with    Valvular Heart Disease    Atrial Fibrillation       HPI:   77 y.o. female with history of mechanical mitral valve and chronic atrial fibrillation. She has single-chamber pacemaker. She has been ventricular pacing 27% of the time. Echocardiogram 2023 with normal LVEF and normal mMVR function. No other valve disease. She denies chest pain or shortness of breath. Past Medical History, Past Surgical History, Family history, Social History, and Medications were all reviewed with the patient today and updated as necessary.      Current Outpatient Medications   Medication Sig Dispense Refill    potassium chloride (KLOR-CON M) 20 MEQ extended release tablet       acetaminophen (TYLENOL) 500 MG tablet Take by mouth as needed      albuterol sulfate (PROAIR RESPICLICK) 013 (90 Base) MCG/ACT aerosol powder inhalation Inhale 0.63 mg into the lungs every 6 hours as needed      aspirin 81 MG EC tablet Take 2 tablets by mouth daily      budesonide-formoterol (SYMBICORT) 160-4.5 MCG/ACT AERO Inhale 2 puffs into the lungs 2 times daily      Cetirizine HCl 10 MG CAPS Take by mouth      coenzyme Q10 100 MG CAPS capsule Take 1 capsule by mouth daily      hydroCHLOROthiazide (HYDRODIURIL) 25 MG tablet Take 1 tablet by mouth daily      losartan (COZAAR) 50 MG tablet Take by mouth daily      montelukast (SINGULAIR) 10 MG tablet Take 1 tablet by mouth daily      simethicone (MYLICON) 80 MG chewable tablet Take 1 tablet by mouth every 6 hours as needed      simvastatin (ZOCOR) 40 MG tablet Take 1 tablet by mouth      warfarin (COUMADIN) 5 MG tablet Take 1 tablet by mouth daily       No current facility-administered medications

## 2023-12-14 ENCOUNTER — TELEPHONE (OUTPATIENT)
Age: 68
End: 2023-12-14

## 2023-12-14 NOTE — TELEPHONE ENCOUNTER
Patient having Colonoscopy on 01/16/24 with Dr. Courtney Lau. Requesting cardiac clearance and Warfarin hold. Will patient require Lovenox bridge?  Fax: [phone]

## 2023-12-14 NOTE — TELEPHONE ENCOUNTER
[facility]   [address], [address] [place] [address]   [phone]  Ashleigh Askew MD   [address] [place] [address], [address] [place] [address]   [phone] (Work)   [phone] (Fax)    Anticoagulant therapy is managed by the patient's PCP. See contact information above. Faxed to [phone].

## 2024-01-10 PROCEDURE — 93294 REM INTERROG EVL PM/LDLS PM: CPT | Performed by: INTERNAL MEDICINE

## 2024-01-10 PROCEDURE — 93296 REM INTERROG EVL PM/IDS: CPT | Performed by: INTERNAL MEDICINE

## 2024-04-29 PROCEDURE — 93294 REM INTERROG EVL PM/LDLS PM: CPT | Performed by: INTERNAL MEDICINE

## 2024-04-29 PROCEDURE — 93296 REM INTERROG EVL PM/IDS: CPT | Performed by: INTERNAL MEDICINE

## 2024-05-20 ENCOUNTER — OFFICE VISIT (OUTPATIENT)
Age: 69
End: 2024-05-20
Payer: MEDICARE

## 2024-05-20 VITALS
HEART RATE: 70 BPM | HEIGHT: 62 IN | BODY MASS INDEX: 30.91 KG/M2 | SYSTOLIC BLOOD PRESSURE: 122 MMHG | WEIGHT: 168 LBS | DIASTOLIC BLOOD PRESSURE: 70 MMHG

## 2024-05-20 DIAGNOSIS — Z95.0 PACEMAKER: ICD-10-CM

## 2024-05-20 DIAGNOSIS — I48.21 PERMANENT ATRIAL FIBRILLATION (HCC): ICD-10-CM

## 2024-05-20 DIAGNOSIS — I10 ESSENTIAL HYPERTENSION, BENIGN: ICD-10-CM

## 2024-05-20 DIAGNOSIS — Z95.2 H/O MITRAL VALVE REPLACEMENT WITH MECHANICAL VALVE: Primary | ICD-10-CM

## 2024-05-20 DIAGNOSIS — E78.00 PURE HYPERCHOLESTEROLEMIA: ICD-10-CM

## 2024-05-20 PROCEDURE — G8417 CALC BMI ABV UP PARAM F/U: HCPCS | Performed by: INTERNAL MEDICINE

## 2024-05-20 PROCEDURE — 3017F COLORECTAL CA SCREEN DOC REV: CPT | Performed by: INTERNAL MEDICINE

## 2024-05-20 PROCEDURE — 99214 OFFICE O/P EST MOD 30 MIN: CPT | Performed by: INTERNAL MEDICINE

## 2024-05-20 PROCEDURE — 1123F ACP DISCUSS/DSCN MKR DOCD: CPT | Performed by: INTERNAL MEDICINE

## 2024-05-20 PROCEDURE — 3078F DIAST BP <80 MM HG: CPT | Performed by: INTERNAL MEDICINE

## 2024-05-20 PROCEDURE — G8427 DOCREV CUR MEDS BY ELIG CLIN: HCPCS | Performed by: INTERNAL MEDICINE

## 2024-05-20 PROCEDURE — 1090F PRES/ABSN URINE INCON ASSESS: CPT | Performed by: INTERNAL MEDICINE

## 2024-05-20 PROCEDURE — 3074F SYST BP LT 130 MM HG: CPT | Performed by: INTERNAL MEDICINE

## 2024-05-20 PROCEDURE — G8399 PT W/DXA RESULTS DOCUMENT: HCPCS | Performed by: INTERNAL MEDICINE

## 2024-05-20 PROCEDURE — 1036F TOBACCO NON-USER: CPT | Performed by: INTERNAL MEDICINE

## 2024-05-20 RX ORDER — ESCITALOPRAM OXALATE 10 MG/1
10 TABLET ORAL DAILY
COMMUNITY

## 2024-05-20 ASSESSMENT — ENCOUNTER SYMPTOMS
COUGH: 0
SHORTNESS OF BREATH: 0
ABDOMINAL PAIN: 0
BACK PAIN: 0

## 2024-05-20 NOTE — PROGRESS NOTES
irregular heart beat, hypertension and hyperlipidemia.    Diagnoses and all orders for this visit:    H/O mitral valve replacement with mechanical valve -remains anticoagulated on warfarin.  Normal by exam today.  Normal function by echocardiogram 11/2023.     Pacemaker -patient has single-chamber ventricular pacemaker. RV pacing only 27% of the time.      Permanent atrial fibrillation (HCC) -chronic atrial fibrillation.  She is anticoagulated with warfarin due to mechanical mitral valve.  May eventually need AV node ablation and biventricular device.  We will avoid adding beta-blocker as do not want to increase overall RV pacing.     Essential hypertension, benign -on HCTZ and losartan    Pure hypercholesterolemia -well controlled on simvastatin 40 mg daily.          Problem List Items Addressed This Visit          Circulatory    Pacemaker    Permanent atrial fibrillation (HCC)    Essential hypertension, benign    Relevant Medications    EPINEPHrine HCl, Anaphylaxis, (EPIPEN IM)       Other    Hyperlipidemia    Relevant Medications    EPINEPHrine HCl, Anaphylaxis, (EPIPEN IM)    H/O mitral valve replacement with mechanical valve - Primary    Relevant Orders    Echo (TTE) complete (PRN contrast/bubble/strain/3D)     There are no discontinued medications.                Patient has been instructed and agrees to call our office with any issues or other concerns related to their cardiac condition(s) and/or complaint(s).      Return in about 6 months (around 11/20/2024).       BENI UMANA MD  5/20/2024

## 2024-08-20 PROCEDURE — 93294 REM INTERROG EVL PM/LDLS PM: CPT | Performed by: INTERNAL MEDICINE

## 2024-08-20 PROCEDURE — 93296 REM INTERROG EVL PM/IDS: CPT | Performed by: INTERNAL MEDICINE

## 2024-11-07 ENCOUNTER — NURSE ONLY (OUTPATIENT)
Age: 69
End: 2024-11-07

## 2024-11-07 DIAGNOSIS — I48.21 PERMANENT ATRIAL FIBRILLATION (HCC): Primary | ICD-10-CM

## 2024-12-02 ENCOUNTER — OFFICE VISIT (OUTPATIENT)
Age: 69
End: 2024-12-02
Payer: MEDICARE

## 2024-12-02 VITALS
HEART RATE: 78 BPM | DIASTOLIC BLOOD PRESSURE: 74 MMHG | WEIGHT: 168 LBS | HEIGHT: 62 IN | SYSTOLIC BLOOD PRESSURE: 125 MMHG | BODY MASS INDEX: 30.91 KG/M2

## 2024-12-02 DIAGNOSIS — E78.00 PURE HYPERCHOLESTEROLEMIA: ICD-10-CM

## 2024-12-02 DIAGNOSIS — Z95.2 H/O MITRAL VALVE REPLACEMENT WITH MECHANICAL VALVE: ICD-10-CM

## 2024-12-02 DIAGNOSIS — Z95.0 PACEMAKER: ICD-10-CM

## 2024-12-02 DIAGNOSIS — I10 ESSENTIAL HYPERTENSION, BENIGN: ICD-10-CM

## 2024-12-02 DIAGNOSIS — I48.21 PERMANENT ATRIAL FIBRILLATION (HCC): Primary | ICD-10-CM

## 2024-12-02 PROCEDURE — 1159F MED LIST DOCD IN RCRD: CPT | Performed by: INTERNAL MEDICINE

## 2024-12-02 PROCEDURE — 1123F ACP DISCUSS/DSCN MKR DOCD: CPT | Performed by: INTERNAL MEDICINE

## 2024-12-02 PROCEDURE — 99214 OFFICE O/P EST MOD 30 MIN: CPT | Performed by: INTERNAL MEDICINE

## 2024-12-02 PROCEDURE — G8417 CALC BMI ABV UP PARAM F/U: HCPCS | Performed by: INTERNAL MEDICINE

## 2024-12-02 PROCEDURE — G8427 DOCREV CUR MEDS BY ELIG CLIN: HCPCS | Performed by: INTERNAL MEDICINE

## 2024-12-02 PROCEDURE — 93000 ELECTROCARDIOGRAM COMPLETE: CPT | Performed by: INTERNAL MEDICINE

## 2024-12-02 PROCEDURE — 3017F COLORECTAL CA SCREEN DOC REV: CPT | Performed by: INTERNAL MEDICINE

## 2024-12-02 PROCEDURE — G8399 PT W/DXA RESULTS DOCUMENT: HCPCS | Performed by: INTERNAL MEDICINE

## 2024-12-02 PROCEDURE — 3078F DIAST BP <80 MM HG: CPT | Performed by: INTERNAL MEDICINE

## 2024-12-02 PROCEDURE — G8484 FLU IMMUNIZE NO ADMIN: HCPCS | Performed by: INTERNAL MEDICINE

## 2024-12-02 PROCEDURE — 1126F AMNT PAIN NOTED NONE PRSNT: CPT | Performed by: INTERNAL MEDICINE

## 2024-12-02 PROCEDURE — 3074F SYST BP LT 130 MM HG: CPT | Performed by: INTERNAL MEDICINE

## 2024-12-02 PROCEDURE — 1036F TOBACCO NON-USER: CPT | Performed by: INTERNAL MEDICINE

## 2024-12-02 PROCEDURE — 1090F PRES/ABSN URINE INCON ASSESS: CPT | Performed by: INTERNAL MEDICINE

## 2024-12-02 RX ORDER — LORATADINE 10 MG/1
10 CAPSULE, LIQUID FILLED ORAL DAILY
COMMUNITY

## 2024-12-02 ASSESSMENT — ENCOUNTER SYMPTOMS
BACK PAIN: 0
COUGH: 0
SHORTNESS OF BREATH: 0
ABDOMINAL PAIN: 0

## 2024-12-02 NOTE — PROGRESS NOTES
tablet by mouth daily      Cetirizine HCl 10 MG CAPS Take by mouth (Patient not taking: Reported on 12/2/2024)      simethicone (MYLICON) 80 MG chewable tablet Take 1 tablet by mouth every 6 hours as needed (Patient not taking: Reported on 5/20/2024)       No current facility-administered medications for this visit.     Patient Active Problem List    Diagnosis Date Noted    Nondisplaced fracture of neck of fifth metacarpal bone, right hand, initial encounter for open fracture 04/11/2022     Priority: Low    Right hand pain 03/14/2022     Priority: Low    Recurrent depression (HCC) 01/06/2018     Priority: Low    Pacemaker 08/19/2016     Priority: Low    TIA (transient ischemic attack)      Priority: Low    Essential hypertension, benign 08/20/2015     Priority: Low    Hyperlipidemia      Priority: Low    Asthma 04/04/2014     Priority: Low     Since age 5 years.        GERD (gastroesophageal reflux disease) 04/04/2014     Priority: Low     Possible silent reflux. Protonix started 4/4/2014.        Cough 04/04/2014     Priority: Low    Allergic rhinitis 04/04/2014     Priority: Low     Receives allergy injections at her primary physician.        H/O mitral valve replacement with mechanical valve 11/07/2013     Priority: Low     09/2013:  Normal LV and normal mechanical MR function  11/2015:  Normal LVEF and normal MVR  05/2019:  Normal LVEF and normal MVR  05/2020:  Normal LVEF and normal MVR  05/2021:  Normal LVEF and normal MVR  05/2022:  Normal LVEF and normal MVR  11/2023:  Normal LVEF and normal MVR        Permanent atrial fibrillation (HCC) 11/07/2013     Priority: Low      Family History   Problem Relation Age of Onset    Obesity Father     Colon Cancer Mother     Obesity Mother     Heart Attack Father     Breast Cancer Maternal Aunt 50    Hypertension Father      Social History     Tobacco Use    Smoking status: Former     Current packs/day: 0.00     Types: Cigarettes     Quit date: 1/1/1991     Years since

## 2024-12-04 PROCEDURE — 93294 REM INTERROG EVL PM/LDLS PM: CPT | Performed by: INTERNAL MEDICINE

## 2024-12-04 PROCEDURE — 93296 REM INTERROG EVL PM/IDS: CPT | Performed by: INTERNAL MEDICINE

## 2025-06-05 ENCOUNTER — OFFICE VISIT (OUTPATIENT)
Age: 70
End: 2025-06-05
Payer: MEDICARE

## 2025-06-05 VITALS
WEIGHT: 172.6 LBS | SYSTOLIC BLOOD PRESSURE: 154 MMHG | HEIGHT: 62 IN | HEART RATE: 66 BPM | DIASTOLIC BLOOD PRESSURE: 89 MMHG | BODY MASS INDEX: 31.76 KG/M2

## 2025-06-05 DIAGNOSIS — Z95.0 PACEMAKER: ICD-10-CM

## 2025-06-05 DIAGNOSIS — I10 ESSENTIAL HYPERTENSION, BENIGN: ICD-10-CM

## 2025-06-05 DIAGNOSIS — Z95.2 H/O MITRAL VALVE REPLACEMENT WITH MECHANICAL VALVE: Primary | ICD-10-CM

## 2025-06-05 DIAGNOSIS — E78.00 PURE HYPERCHOLESTEROLEMIA: ICD-10-CM

## 2025-06-05 DIAGNOSIS — I48.21 PERMANENT ATRIAL FIBRILLATION (HCC): ICD-10-CM

## 2025-06-05 PROCEDURE — G8417 CALC BMI ABV UP PARAM F/U: HCPCS | Performed by: INTERNAL MEDICINE

## 2025-06-05 PROCEDURE — 3079F DIAST BP 80-89 MM HG: CPT | Performed by: INTERNAL MEDICINE

## 2025-06-05 PROCEDURE — 99214 OFFICE O/P EST MOD 30 MIN: CPT | Performed by: INTERNAL MEDICINE

## 2025-06-05 PROCEDURE — G8427 DOCREV CUR MEDS BY ELIG CLIN: HCPCS | Performed by: INTERNAL MEDICINE

## 2025-06-05 PROCEDURE — 1126F AMNT PAIN NOTED NONE PRSNT: CPT | Performed by: INTERNAL MEDICINE

## 2025-06-05 PROCEDURE — 1090F PRES/ABSN URINE INCON ASSESS: CPT | Performed by: INTERNAL MEDICINE

## 2025-06-05 PROCEDURE — 1036F TOBACCO NON-USER: CPT | Performed by: INTERNAL MEDICINE

## 2025-06-05 PROCEDURE — 1123F ACP DISCUSS/DSCN MKR DOCD: CPT | Performed by: INTERNAL MEDICINE

## 2025-06-05 PROCEDURE — 3017F COLORECTAL CA SCREEN DOC REV: CPT | Performed by: INTERNAL MEDICINE

## 2025-06-05 PROCEDURE — 1159F MED LIST DOCD IN RCRD: CPT | Performed by: INTERNAL MEDICINE

## 2025-06-05 PROCEDURE — 3077F SYST BP >= 140 MM HG: CPT | Performed by: INTERNAL MEDICINE

## 2025-06-05 PROCEDURE — G8399 PT W/DXA RESULTS DOCUMENT: HCPCS | Performed by: INTERNAL MEDICINE

## 2025-06-05 ASSESSMENT — ENCOUNTER SYMPTOMS
BACK PAIN: 0
ABDOMINAL PAIN: 0
SHORTNESS OF BREATH: 0
COUGH: 0

## 2025-06-05 NOTE — PROGRESS NOTES
[facility] CARDIOLOGY  [address] [place] [address], [address]  [place] [address]      25      NAME:  Aziza Salazar  : 1955  MRN: [id number]      SUBJECTIVE:   Aziza Salazar is a 70 y.o. female seen for a follow up visit regarding the following:     Chief Complaint   Patient presents with    Atrial Fibrillation       HPI:   70 y.o. female with history of mechanical mitral valve and chronic atrial fibrillation.  She has single-chamber pacemaker.  She has been ventricular pacing 27% of the time.  Echocardiogram 2024 with normal LVEF and normal mMVR function.  No other valve disease.  More dyspnea with exertion.            Past Medical History, Past Surgical History, Family history, Social History, and Medications were all reviewed with the patient today and updated as necessary.     Current Outpatient Medications   Medication Sig Dispense Refill    NONFORMULARY Collagen and greens      loratadine (CLARITIN) 10 MG capsule Take 1 capsule by mouth daily      escitalopram (LEXAPRO) 10 MG tablet Take 1 tablet by mouth daily      EPINEPHrine HCl, Anaphylaxis, (EPIPEN IM) Inject into the muscle      potassium chloride (KLOR-CON M) 20 MEQ extended release tablet       acetaminophen (TYLENOL) 500 MG tablet Take by mouth as needed      albuterol sulfate (PROAIR RESPICLICK) 108 (90 Base) MCG/ACT aerosol powder inhalation Inhale 0.63 mg into the lungs every 6 hours as needed      aspirin 81 MG EC tablet Take 2 tablets by mouth daily      budesonide-formoterol (SYMBICORT) 160-4.5 MCG/ACT AERO Inhale 2 puffs into the lungs 2 times daily      Cetirizine HCl 10 MG CAPS Take by mouth      hydroCHLOROthiazide (HYDRODIURIL) 25 MG tablet Take 1 tablet by mouth daily      losartan (COZAAR) 50 MG tablet Take by mouth daily      montelukast (SINGULAIR) 10 MG tablet Take 1 tablet by mouth daily      simethicone (MYLICON) 80 MG chewable tablet Take 1 tablet by mouth every 6 hours as needed      simvastatin (ZOCOR) 40 MG tablet

## 2025-06-06 PROCEDURE — 93296 REM INTERROG EVL PM/IDS: CPT | Performed by: INTERNAL MEDICINE

## 2025-06-06 PROCEDURE — 93294 REM INTERROG EVL PM/LDLS PM: CPT | Performed by: INTERNAL MEDICINE

## 2025-06-16 SDOH — ECONOMIC STABILITY: INCOME INSECURITY: IN THE LAST 12 MONTHS, WAS THERE A TIME WHEN YOU WERE NOT ABLE TO PAY THE MORTGAGE OR RENT ON TIME?: NO

## 2025-06-16 SDOH — ECONOMIC STABILITY: TRANSPORTATION INSECURITY
IN THE PAST 12 MONTHS, HAS THE LACK OF TRANSPORTATION KEPT YOU FROM MEDICAL APPOINTMENTS OR FROM GETTING MEDICATIONS?: NO

## 2025-06-16 SDOH — ECONOMIC STABILITY: FOOD INSECURITY: WITHIN THE PAST 12 MONTHS, YOU WORRIED THAT YOUR FOOD WOULD RUN OUT BEFORE YOU GOT MONEY TO BUY MORE.: NEVER TRUE

## 2025-06-16 SDOH — ECONOMIC STABILITY: FOOD INSECURITY: WITHIN THE PAST 12 MONTHS, THE FOOD YOU BOUGHT JUST DIDN'T LAST AND YOU DIDN'T HAVE MONEY TO GET MORE.: NEVER TRUE

## 2025-06-16 SDOH — ECONOMIC STABILITY: TRANSPORTATION INSECURITY
IN THE PAST 12 MONTHS, HAS LACK OF TRANSPORTATION KEPT YOU FROM MEETINGS, WORK, OR FROM GETTING THINGS NEEDED FOR DAILY LIVING?: NO

## 2025-06-16 NOTE — PROGRESS NOTES
HPI    Aziza Salazar is a 70 y.o. female seen for annual GYN exam.    Past Medical History, Past Surgical History, Family history, Social History, and Medications were all reviewed with the patient today and updated as necessary.     Current Outpatient Medications   Medication Sig    NONFORMULARY Collagen and greens    loratadine (CLARITIN) 10 MG capsule Take 1 capsule by mouth daily    escitalopram (LEXAPRO) 10 MG tablet Take 1 tablet by mouth daily    EPINEPHrine HCl, Anaphylaxis, (EPIPEN IM) Inject into the muscle    potassium chloride (KLOR-CON M) 20 MEQ extended release tablet     acetaminophen (TYLENOL) 500 MG tablet Take by mouth as needed    albuterol sulfate (PROAIR RESPICLICK) 108 (90 Base) MCG/ACT aerosol powder inhalation Inhale 0.63 mg into the lungs every 6 hours as needed    aspirin 81 MG EC tablet Take 2 tablets by mouth daily    budesonide-formoterol (SYMBICORT) 160-4.5 MCG/ACT AERO Inhale 2 puffs into the lungs 2 times daily    Cetirizine HCl 10 MG CAPS Take by mouth    hydroCHLOROthiazide (HYDRODIURIL) 25 MG tablet Take 1 tablet by mouth daily    losartan (COZAAR) 50 MG tablet Take by mouth daily    montelukast (SINGULAIR) 10 MG tablet Take 1 tablet by mouth daily    simethicone (MYLICON) 80 MG chewable tablet Take 1 tablet by mouth every 6 hours as needed    simvastatin (ZOCOR) 40 MG tablet Take 1 tablet by mouth    warfarin (COUMADIN) 5 MG tablet Take 1 tablet by mouth daily    coenzyme Q10 100 MG CAPS capsule Take 1 capsule by mouth daily (Patient not taking: Reported on 6/17/2025)     No current facility-administered medications for this visit.     Allergies   Allergen Reactions    Pork-Derived Products Anaphylaxis    Propoxyphene Anaphylaxis    Iodinated Contrast Media     Iodine Other (See Comments)    Iohexol Other (See Comments)     Other reaction(s): Unknown-Unspecified    Promethazine Other (See Comments)    Codeine Nausea And Vomiting     Past Medical History:   Diagnosis Date

## 2025-06-17 ENCOUNTER — OFFICE VISIT (OUTPATIENT)
Dept: OBGYN CLINIC | Age: 70
End: 2025-06-17
Payer: MEDICARE

## 2025-06-17 VITALS
BODY MASS INDEX: 31.47 KG/M2 | WEIGHT: 171 LBS | HEIGHT: 62 IN | SYSTOLIC BLOOD PRESSURE: 126 MMHG | DIASTOLIC BLOOD PRESSURE: 84 MMHG

## 2025-06-17 DIAGNOSIS — Z12.4 ROUTINE CERVICAL SMEAR: ICD-10-CM

## 2025-06-17 DIAGNOSIS — Z12.31 SCREENING MAMMOGRAM, ENCOUNTER FOR: ICD-10-CM

## 2025-06-17 DIAGNOSIS — Z01.419 ENCOUNTER FOR WELL WOMAN EXAM WITH ROUTINE GYNECOLOGICAL EXAM: Primary | ICD-10-CM

## 2025-06-17 PROCEDURE — 3074F SYST BP LT 130 MM HG: CPT | Performed by: OBSTETRICS & GYNECOLOGY

## 2025-06-17 PROCEDURE — 1160F RVW MEDS BY RX/DR IN RCRD: CPT | Performed by: OBSTETRICS & GYNECOLOGY

## 2025-06-17 PROCEDURE — 1159F MED LIST DOCD IN RCRD: CPT | Performed by: OBSTETRICS & GYNECOLOGY

## 2025-06-17 PROCEDURE — G0101 CA SCREEN;PELVIC/BREAST EXAM: HCPCS | Performed by: OBSTETRICS & GYNECOLOGY

## 2025-06-17 PROCEDURE — 3079F DIAST BP 80-89 MM HG: CPT | Performed by: OBSTETRICS & GYNECOLOGY

## 2025-06-18 LAB
COLLECTION METHOD: NORMAL
CYTOLOGIST CVX/VAG CYTO: NORMAL
CYTOLOGY CVX/VAG DOC THIN PREP: NORMAL
HPV REFLEX: NORMAL
Lab: NORMAL
OTHER PT INFO: NORMAL
PAP SOURCE: NORMAL
PATH REPORT.FINAL DX SPEC: NORMAL
STAT OF ADQ CVX/VAG CYTO-IMP: NORMAL

## 2025-07-10 ENCOUNTER — HOSPITAL ENCOUNTER (OUTPATIENT)
Dept: MAMMOGRAPHY | Age: 70
Discharge: HOME | End: 2025-07-13
Attending: OBSTETRICS & GYNECOLOGY
Payer: MEDICARE

## 2025-07-10 VITALS — HEIGHT: 62 IN | WEIGHT: 170 LBS | BODY MASS INDEX: 31.28 KG/M2

## 2025-07-10 DIAGNOSIS — Z12.31 SCREENING MAMMOGRAM, ENCOUNTER FOR: ICD-10-CM

## 2025-07-10 PROCEDURE — 77063 BREAST TOMOSYNTHESIS BI: CPT

## (undated) DEVICE — KENDALL RADIOLUCENT FOAM MONITORING ELECTRODE RECTANGULAR SHAPE: Brand: KENDALL

## (undated) DEVICE — CANNULA NSL ORAL AD FOR CAPNOFLEX CO2 O2 AIRLFE

## (undated) DEVICE — CONNECTOR TBNG OD5-7MM O2 END DISP

## (undated) DEVICE — SYR 3ML LL TIP 1/10ML GRAD --

## (undated) DEVICE — NDL PRT INJ NSAF BLNT 18GX1.5 --

## (undated) DEVICE — SYR 5ML 1/5 GRAD LL NSAF LF --